# Patient Record
Sex: MALE | Race: WHITE
[De-identification: names, ages, dates, MRNs, and addresses within clinical notes are randomized per-mention and may not be internally consistent; named-entity substitution may affect disease eponyms.]

---

## 2017-09-01 ENCOUNTER — HOSPITAL ENCOUNTER (EMERGENCY)
Dept: HOSPITAL 47 - EC | Age: 32
Discharge: HOME | End: 2017-09-01
Payer: SELF-PAY

## 2017-09-01 VITALS
HEART RATE: 83 BPM | RESPIRATION RATE: 16 BRPM | DIASTOLIC BLOOD PRESSURE: 56 MMHG | TEMPERATURE: 98.1 F | SYSTOLIC BLOOD PRESSURE: 117 MMHG

## 2017-09-01 DIAGNOSIS — J44.0: Primary | ICD-10-CM

## 2017-09-01 DIAGNOSIS — J18.9: ICD-10-CM

## 2017-09-01 DIAGNOSIS — F17.200: ICD-10-CM

## 2017-09-01 LAB
ANION GAP SERPL CALC-SCNC: 8 MMOL/L
BASOPHILS # BLD AUTO: 0 K/UL (ref 0–0.2)
BASOPHILS NFR BLD AUTO: 1 %
BUN SERPL-SCNC: 8 MG/DL (ref 9–20)
CALCIUM SPEC-MCNC: 9.2 MG/DL (ref 8.4–10.2)
CH: 29.2
CHCM: 35.4
CHLORIDE SERPL-SCNC: 109 MMOL/L (ref 98–107)
CO2 SERPL-SCNC: 24 MMOL/L (ref 22–30)
EOSINOPHIL # BLD AUTO: 0.1 K/UL (ref 0–0.7)
EOSINOPHIL NFR BLD AUTO: 2 %
ERYTHROCYTE [DISTWIDTH] IN BLOOD BY AUTOMATED COUNT: 5.4 M/UL (ref 4.3–5.9)
ERYTHROCYTE [DISTWIDTH] IN BLOOD: 13.3 % (ref 11.5–15.5)
GLUCOSE SERPL-MCNC: 106 MG/DL (ref 74–99)
HCT VFR BLD AUTO: 44.8 % (ref 39–53)
HDW: 3.06
HGB BLD-MCNC: 15.7 GM/DL (ref 13–17.5)
LUC NFR BLD AUTO: 4 %
LYMPHOCYTES # SPEC AUTO: 1.3 K/UL (ref 1–4.8)
LYMPHOCYTES NFR SPEC AUTO: 26 %
MCH RBC QN AUTO: 29.1 PG (ref 25–35)
MCHC RBC AUTO-ENTMCNC: 35.1 G/DL (ref 31–37)
MCV RBC AUTO: 83 FL (ref 80–100)
MONOCYTES # BLD AUTO: 0.3 K/UL (ref 0–1)
MONOCYTES NFR BLD AUTO: 6 %
NEUTROPHILS # BLD AUTO: 3.1 K/UL (ref 1.3–7.7)
NEUTROPHILS NFR BLD AUTO: 62 %
NON-AFRICAN AMERICAN GFR(MDRD): >60
POTASSIUM SERPL-SCNC: 4.3 MMOL/L (ref 3.5–5.1)
SODIUM SERPL-SCNC: 141 MMOL/L (ref 137–145)
WBC # BLD AUTO: 0.17 10*3/UL
WBC # BLD AUTO: 4.9 K/UL (ref 3.8–10.6)
WBC (PEROX): 5.13

## 2017-09-01 PROCEDURE — 99284 EMERGENCY DEPT VISIT MOD MDM: CPT

## 2017-09-01 PROCEDURE — 71020: CPT

## 2017-09-01 PROCEDURE — 96375 TX/PRO/DX INJ NEW DRUG ADDON: CPT

## 2017-09-01 PROCEDURE — 36415 COLL VENOUS BLD VENIPUNCTURE: CPT

## 2017-09-01 PROCEDURE — 96366 THER/PROPH/DIAG IV INF ADDON: CPT

## 2017-09-01 PROCEDURE — 80048 BASIC METABOLIC PNL TOTAL CA: CPT

## 2017-09-01 PROCEDURE — 96365 THER/PROPH/DIAG IV INF INIT: CPT

## 2017-09-01 PROCEDURE — 87502 INFLUENZA DNA AMP PROBE: CPT

## 2017-09-01 PROCEDURE — 94640 AIRWAY INHALATION TREATMENT: CPT

## 2017-09-01 PROCEDURE — 85025 COMPLETE CBC W/AUTO DIFF WBC: CPT

## 2017-09-01 PROCEDURE — 85379 FIBRIN DEGRADATION QUANT: CPT

## 2017-09-01 NOTE — XR
EXAMINATION TYPE: XR chest 2V

 

DATE OF EXAM: 9/1/2017

 

COMPARISON: NONE

 

HISTORY: Cough and congestion with shortness of breath rule out pneumonia

 

TECHNIQUE:  Frontal and lateral views of the chest are obtained.

 

FINDINGS:  Seen on 2 views there appears to be increased opacity right middle lobe worrisome for deve
loping infiltrate. Left lung is clear. No pleural effusion or pneumothorax is seen bilaterally. The c
ardiac silhouette size is within normal limits.   The osseous structures are intact.

 

IMPRESSION:  Suspicious opacity worrisome for developing right middle lobe infiltrate.

## 2017-09-01 NOTE — ED
URI HPI





- General


Chief Complaint: Upper Respiratory Infection


Stated Complaint: ILSA, BACK PAIN, WHEEZING, FEVER


Time Seen by Provider: 09/01/17 11:59


Source: patient


Mode of arrival: ambulatory


Limitations: no limitations





- History of Present Illness


Initial Comments: 





32 years old male mentioned to the last 6 days, been coughing he did spit up 

some blood as well and he is not able to bring up a bunch of phlegm he feels it 

stuck to Bring it up.  He been previously had a fever off-and-on for the last 

few days he does have a history of tobacco dependence were also 18 years he 

felt quite sec wasn't able to go to work yesterday and today.  Also complains 

about chest pain only with deep breaths no trauma to the chest, degree of 

system is unremarkable otherwise





- Related Data


 Previous Rx's











 Medication  Instructions  Recorded


 


Levofloxacin [Levaquin] 750 mg PO DAILY #7 tab 09/01/17











 Allergies











Allergy/AdvReac Type Severity Reaction Status Date / Time


 


No Known Allergies Allergy   Verified 09/01/17 11:43














Review of Systems


ROS Statement: 


Those systems with pertinent positive or pertinent negative responses have been 

documented in the HPI.





ROS Other: All systems not noted in ROS Statement are negative.





Past Medical History


Additional Past Medical History / Comment(s): Hepatitic C


History of Any Multi-Drug Resistant Organisms: None Reported


Past Surgical History: No Surgical Hx Reported


Past Psychological History: No Psychological Hx Reported


Smoking Status: Current every day smoker


Past Alcohol Use History: None Reported


Past Drug Use History: Marijuana





General Exam





- General Exam Comments


Initial Comments: 





General:  The patient is awake and alert, in no distress, and does not appear 

acutely ill.  There is no coughing quite bad


Skin:  Skin is warm and dry and no rashes or lesions are noted. 


Eye:  Pupils are equal, round and reactive to light, extra-ocular movements are 

intact; there is normal conjunctiva bilaterally.  


Ears, nose, mouth and throat:  There are moist mucous membranes and no oral 

lesions. 


Neck:  The neck is supple, there is no tenderness  or JVD.  


Cardiovascular:  There is a regular rate and rhythm. No murmur, rub or gallop 

is appreciated.


Respiratory: To auscultation bilateral, noticed some crackles on the right side 

and he is wheezing bilateral


Gastrointestinal:  Soft, non-distended, non-tender abdomen without masses or 

organomegaly noted. There is no rebound or guarding present. Bowel sounds are 

unremarkable. 


Back:  There is no tenderness to palpation in the midline. There is no obvious 

deformity.


Musculoskeletal:  Normal ROM, no tenderness, There is no pedal edema. There is 

no calf tenderness or swelling. No cords were appreciated.  


Neurological:  CN II-XII intact, Cranial nerves III through XII are intact. 

There are no obvious motor or sensory deficits. Coordination appears grossly 

intact. Speech is normal.


Psychiatric:  Cooperative, appropriate mood & affect, normal judgment.  





Limitations: no limitations





Course





 Vital Signs











  09/01/17 09/01/17 09/01/17





  11:32 12:19 12:25


 


Temperature 97.4 F L  


 


Pulse Rate 85 84 80


 


Respiratory 18  





Rate   


 


Blood Pressure 131/69  


 


O2 Sat by Pulse 95  





Oximetry   








Labs are reviewed along with the imaging, CBC is normal, d-dimer is negative 

chest x-ray points out towards developing infiltrate and exam is consistent 

with a COPD





Medical Decision Making





- Lab Data


Result diagrams: 


 09/01/17 12:30





 09/01/17 12:30





 Lab Results











  09/01/17 09/01/17 09/01/17 Range/Units





  12:30 12:30 12:30 


 


WBC   4.9   (3.8-10.6)  k/uL


 


RBC   5.40   (4.30-5.90)  m/uL


 


Hgb   15.7   (13.0-17.5)  gm/dL


 


Hct   44.8   (39.0-53.0)  %


 


MCV   83.0   (80.0-100.0)  fL


 


MCH   29.1   (25.0-35.0)  pg


 


MCHC   35.1   (31.0-37.0)  g/dL


 


RDW   13.3   (11.5-15.5)  %


 


Plt Count   236   (150-450)  k/uL


 


Neutrophils %   62   %


 


Lymphocytes %   26   %


 


Monocytes %   6   %


 


Eosinophils %   2   %


 


Basophils %   1   %


 


Neutrophils #   3.1   (1.3-7.7)  k/uL


 


Lymphocytes #   1.3   (1.0-4.8)  k/uL


 


Monocytes #   0.3   (0-1.0)  k/uL


 


Eosinophils #   0.1   (0-0.7)  k/uL


 


Basophils #   0.0   (0-0.2)  k/uL


 


D-Dimer    0.57  (<0.60)  mg/L FEU


 


Sodium  141    (137-145)  mmol/L


 


Potassium  4.3    (3.5-5.1)  mmol/L


 


Chloride  109 H    ()  mmol/L


 


Carbon Dioxide  24    (22-30)  mmol/L


 


Anion Gap  8    mmol/L


 


BUN  8 L    (9-20)  mg/dL


 


Creatinine  0.70    (0.66-1.25)  mg/dL


 


Est GFR (MDRD) Af Amer  >60    (>60 ml/min/1.73 sqM)  


 


Est GFR (MDRD) Non-Af  >60    (>60 ml/min/1.73 sqM)  


 


Glucose  106 H    (74-99)  mg/dL


 


Calcium  9.2    (8.4-10.2)  mg/dL


 


Influenza Type A RNA     (Not Detectd)  


 


Influenza Type B (PCR)     (Not Detectd)  














  09/01/17 Range/Units





  12:30 


 


WBC   (3.8-10.6)  k/uL


 


RBC   (4.30-5.90)  m/uL


 


Hgb   (13.0-17.5)  gm/dL


 


Hct   (39.0-53.0)  %


 


MCV   (80.0-100.0)  fL


 


MCH   (25.0-35.0)  pg


 


MCHC   (31.0-37.0)  g/dL


 


RDW   (11.5-15.5)  %


 


Plt Count   (150-450)  k/uL


 


Neutrophils %   %


 


Lymphocytes %   %


 


Monocytes %   %


 


Eosinophils %   %


 


Basophils %   %


 


Neutrophils #   (1.3-7.7)  k/uL


 


Lymphocytes #   (1.0-4.8)  k/uL


 


Monocytes #   (0-1.0)  k/uL


 


Eosinophils #   (0-0.7)  k/uL


 


Basophils #   (0-0.2)  k/uL


 


D-Dimer   (<0.60)  mg/L FEU


 


Sodium   (137-145)  mmol/L


 


Potassium   (3.5-5.1)  mmol/L


 


Chloride   ()  mmol/L


 


Carbon Dioxide   (22-30)  mmol/L


 


Anion Gap   mmol/L


 


BUN   (9-20)  mg/dL


 


Creatinine   (0.66-1.25)  mg/dL


 


Est GFR (MDRD) Af Amer   (>60 ml/min/1.73 sqM)  


 


Est GFR (MDRD) Non-Af   (>60 ml/min/1.73 sqM)  


 


Glucose   (74-99)  mg/dL


 


Calcium   (8.4-10.2)  mg/dL


 


Influenza Type A RNA  Not Detected  (Not Detectd)  


 


Influenza Type B (PCR)  Not Detected  (Not Detectd)  














Disposition


Clinical Impression: 


 Pneumonia, COPD (chronic obstructive pulmonary disease)





Disposition: HOME SELF-CARE


Condition: Good


Instructions:  Pneumonia (ED)


Prescriptions: 


Levofloxacin [Levaquin] 750 mg PO DAILY #7 tab


Referrals: 


None,Stated [Primary Care Provider] - 1-2 days

## 2017-10-11 ENCOUNTER — HOSPITAL ENCOUNTER (EMERGENCY)
Dept: HOSPITAL 47 - EC | Age: 32
Discharge: HOME | End: 2017-10-11
Payer: COMMERCIAL

## 2017-10-11 VITALS — HEART RATE: 68 BPM | SYSTOLIC BLOOD PRESSURE: 124 MMHG | DIASTOLIC BLOOD PRESSURE: 58 MMHG

## 2017-10-11 VITALS — RESPIRATION RATE: 18 BRPM | TEMPERATURE: 97.8 F

## 2017-10-11 DIAGNOSIS — R11.0: ICD-10-CM

## 2017-10-11 DIAGNOSIS — R10.9: Primary | ICD-10-CM

## 2017-10-11 DIAGNOSIS — F17.200: ICD-10-CM

## 2017-10-11 LAB
ALP SERPL-CCNC: 125 U/L (ref 38–126)
ALT SERPL-CCNC: 89 U/L (ref 21–72)
AMYLASE SERPL-CCNC: 73 U/L (ref 30–110)
ANION GAP SERPL CALC-SCNC: 9 MMOL/L
AST SERPL-CCNC: 54 U/L (ref 17–59)
BASOPHILS # BLD AUTO: 0 K/UL (ref 0–0.2)
BASOPHILS NFR BLD AUTO: 1 %
BUN SERPL-SCNC: 9 MG/DL (ref 9–20)
CALCIUM SPEC-MCNC: 8.9 MG/DL (ref 8.4–10.2)
CH: 29.6
CHCM: 34.1
CHLORIDE SERPL-SCNC: 107 MMOL/L (ref 98–107)
CO2 SERPL-SCNC: 23 MMOL/L (ref 22–30)
EOSINOPHIL # BLD AUTO: 0.2 K/UL (ref 0–0.7)
EOSINOPHIL NFR BLD AUTO: 2 %
ERYTHROCYTE [DISTWIDTH] IN BLOOD BY AUTOMATED COUNT: 5.12 M/UL (ref 4.3–5.9)
ERYTHROCYTE [DISTWIDTH] IN BLOOD: 13.5 % (ref 11.5–15.5)
GLUCOSE SERPL-MCNC: 91 MG/DL (ref 74–99)
HCT VFR BLD AUTO: 44.7 % (ref 39–53)
HDW: 2.78
HGB BLD-MCNC: 14.9 GM/DL (ref 13–17.5)
LUC NFR BLD AUTO: 4 %
LYMPHOCYTES # SPEC AUTO: 1.2 K/UL (ref 1–4.8)
LYMPHOCYTES NFR SPEC AUTO: 16 %
MCH RBC QN AUTO: 29 PG (ref 25–35)
MCHC RBC AUTO-ENTMCNC: 33.3 G/DL (ref 31–37)
MCV RBC AUTO: 87.3 FL (ref 80–100)
MONOCYTES # BLD AUTO: 0.7 K/UL (ref 0–1)
MONOCYTES NFR BLD AUTO: 10 %
NEUTROPHILS # BLD AUTO: 5.2 K/UL (ref 1.3–7.7)
NEUTROPHILS NFR BLD AUTO: 68 %
NON-AFRICAN AMERICAN GFR(MDRD): >60
PH UR: 6 [PH] (ref 5–8)
POTASSIUM SERPL-SCNC: 4.4 MMOL/L (ref 3.5–5.1)
PROT SERPL-MCNC: 6.9 G/DL (ref 6.3–8.2)
SODIUM SERPL-SCNC: 139 MMOL/L (ref 137–145)
SP GR UR: 1.01 (ref 1–1.03)
UA BILLING (MACRO VS. MICRO): (no result)
UROBILINOGEN UR QL STRIP: <2 MG/DL (ref ?–2)
WBC # BLD AUTO: 0.28 10*3/UL
WBC # BLD AUTO: 7.7 K/UL (ref 3.8–10.6)
WBC (PEROX): 7.58

## 2017-10-11 PROCEDURE — 85025 COMPLETE CBC W/AUTO DIFF WBC: CPT

## 2017-10-11 PROCEDURE — 80053 COMPREHEN METABOLIC PANEL: CPT

## 2017-10-11 PROCEDURE — 83690 ASSAY OF LIPASE: CPT

## 2017-10-11 PROCEDURE — 74000: CPT

## 2017-10-11 PROCEDURE — 82150 ASSAY OF AMYLASE: CPT

## 2017-10-11 PROCEDURE — 81003 URINALYSIS AUTO W/O SCOPE: CPT

## 2017-10-11 PROCEDURE — 36415 COLL VENOUS BLD VENIPUNCTURE: CPT

## 2017-10-11 PROCEDURE — 99284 EMERGENCY DEPT VISIT MOD MDM: CPT

## 2017-10-11 PROCEDURE — 96361 HYDRATE IV INFUSION ADD-ON: CPT

## 2017-10-11 PROCEDURE — 96375 TX/PRO/DX INJ NEW DRUG ADDON: CPT

## 2017-10-11 PROCEDURE — 96374 THER/PROPH/DIAG INJ IV PUSH: CPT

## 2017-10-11 NOTE — XR
EXAMINATION TYPE: XR KUB

 

DATE OF EXAM: 10/11/2017

 

COMPARISON: NONE

 

HISTORY: Left flank pain

 

TECHNIQUE: 2 views

 

FINDINGS: Bowel gas pattern is normal. There is no sign of intestinal obstruction or pneumoperitoneum
. Fecal pattern is normal. There are no pathologic calcifications. Lung bases are clear. There is no 
sign of a mass. Bony structures appear intact.

 

IMPRESSION: Nonacute abdomen.

## 2017-10-11 NOTE — ED
Abdominal Pain HPI





- General


Chief Complaint: Abdominal Pain


Stated Complaint: side pain


Time Seen by Provider: 10/11/17 08:49


Source: patient, RN notes reviewed


Mode of arrival: ambulatory


Limitations: no limitations





- History of Present Illness


Initial Comments: 





This a 32-year-old male presents emergency Department chief complaint left side 

pain.  Patient states pain started yesterday was sudden onset of pain.  Patient 

states pain radiates on his left low back to his left lower abdomen.  Patient 

states that nothing makes the pain feel better or worse.  Patient's had some 

nausea no vomiting no diarrhea no constipation.  He states his urine looked 

very dark ashtray when it started.  He states that he developed bloody though.  

Denies any dysuria, chest pain or shortness breath.





- Related Data


 Previous Rx's











 Medication  Instructions  Recorded


 


Acetaminophen-Codeine 300-30mg 1 tab PO Q4H PRN #20 tablet 10/11/17





[Tylenol #3]  


 


Ibuprofen [Motrin] 600 mg PO Q8HR PRN #30 tab 10/11/17











 Allergies











Allergy/AdvReac Type Severity Reaction Status Date / Time


 


No Known Allergies Allergy   Verified 10/11/17 09:13














Review of Systems


ROS Statement: 


Those systems with pertinent positive or pertinent negative responses have been 

documented in the HPI.





ROS Other: All systems not noted in ROS Statement are negative.





Past Medical History


Additional Past Medical History / Comment(s): Hepatitic C


History of Any Multi-Drug Resistant Organisms: None Reported


Past Surgical History: No Surgical Hx Reported


Past Psychological History: No Psychological Hx Reported


Smoking Status: Current every day smoker


Past Alcohol Use History: None Reported


Past Drug Use History: Marijuana





General Exam


Limitations: no limitations


General appearance: alert, in no apparent distress


Head exam: Present: atraumatic, normocephalic, normal inspection


Respiratory exam: Present: normal lung sounds bilaterally.  Absent: respiratory 

distress, wheezes, rales, rhonchi, stridor


Cardiovascular Exam: Present: regular rate, normal rhythm, normal heart sounds.

  Absent: systolic murmur, diastolic murmur, rubs, gallop, clicks


GI/Abdominal exam: Present: soft, normal bowel sounds.  Absent: distended, 

tenderness, guarding, rebound, rigid


Back exam: Absent: CVA tenderness (R), CVA tenderness (L)


Skin exam: Present: warm, dry, intact, normal color.  Absent: rash





Course


 Vital Signs











  10/11/17 10/11/17





  08:45 10:42


 


Temperature 97.8 F 


 


Pulse Rate 93 68


 


Respiratory 18 18





Rate  


 


Blood Pressure 129/77 124/58


 


O2 Sat by Pulse 99 96





Oximetry  














Medical Decision Making





- Medical Decision Making





32-year-old male presented unresponsive for left flank pain.  Patient's lab work

, x-ray was reviewed there is no acute abnormality.  Patient does not have any 

anterior more consistent with stone.  Patient pain may be muscle skeletal in 

nature at this time.  Patient will be discharged advised follow-up with PCP 

return parameters were discussed.





- Lab Data


Result diagrams: 


 10/11/17 09:15





 10/11/17 09:15


 Lab Results











  10/11/17 10/11/17 10/11/17 Range/Units





  09:15 09:15 09:25 


 


WBC   7.7   (3.8-10.6)  k/uL


 


RBC   5.12   (4.30-5.90)  m/uL


 


Hgb   14.9   (13.0-17.5)  gm/dL


 


Hct   44.7   (39.0-53.0)  %


 


MCV   87.3   (80.0-100.0)  fL


 


MCH   29.0   (25.0-35.0)  pg


 


MCHC   33.3   (31.0-37.0)  g/dL


 


RDW   13.5   (11.5-15.5)  %


 


Plt Count   242   (150-450)  k/uL


 


Neutrophils %   68   %


 


Lymphocytes %   16   %


 


Monocytes %   10   %


 


Eosinophils %   2   %


 


Basophils %   1   %


 


Neutrophils #   5.2   (1.3-7.7)  k/uL


 


Lymphocytes #   1.2   (1.0-4.8)  k/uL


 


Monocytes #   0.7   (0-1.0)  k/uL


 


Eosinophils #   0.2   (0-0.7)  k/uL


 


Basophils #   0.0   (0-0.2)  k/uL


 


Sodium  139    (137-145)  mmol/L


 


Potassium  4.4    (3.5-5.1)  mmol/L


 


Chloride  107    ()  mmol/L


 


Carbon Dioxide  23    (22-30)  mmol/L


 


Anion Gap  9    mmol/L


 


BUN  9    (9-20)  mg/dL


 


Creatinine  0.81    (0.66-1.25)  mg/dL


 


Est GFR (MDRD) Af Amer  >60    (>60 ml/min/1.73 sqM)  


 


Est GFR (MDRD) Non-Af  >60    (>60 ml/min/1.73 sqM)  


 


Glucose  91    (74-99)  mg/dL


 


Calcium  8.9    (8.4-10.2)  mg/dL


 


Total Bilirubin  0.6    (0.2-1.3)  mg/dL


 


AST  54    (17-59)  U/L


 


ALT  89 H    (21-72)  U/L


 


Alkaline Phosphatase  125    ()  U/L


 


Total Protein  6.9    (6.3-8.2)  g/dL


 


Albumin  3.6    (3.5-5.0)  g/dL


 


Amylase  73    ()  U/L


 


Lipase  89    ()  U/L


 


Urine Color    Yellow  


 


Urine Appearance    Clear  (Clear)  


 


Urine pH    6.0  (5.0-8.0)  


 


Ur Specific Gravity    1.010  (1.001-1.035)  


 


Urine Protein    Negative  (Negative)  


 


Urine Glucose (UA)    Negative  (Negative)  


 


Urine Ketones    Negative  (Negative)  


 


Urine Blood    Negative  (Negative)  


 


Urine Nitrite    Negative  (Negative)  


 


Urine Bilirubin    Negative  (Negative)  


 


Urine Urobilinogen    <2.0  (<2.0)  mg/dL


 


Ur Leukocyte Esterase    Negative  (Negative)  














Disposition


Clinical Impression: 


 Flank pain





Disposition: HOME SELF-CARE


Condition: Stable


Instructions:  Flank Pain (ED)


Additional Instructions: 


Please return to the Emergency Department if symptoms worsen or any other 

concerns.


Prescriptions: 


Acetaminophen-Codeine 300-30mg [Tylenol #3] 1 tab PO Q4H PRN #20 tablet


 PRN Reason: pain


Ibuprofen [Motrin] 600 mg PO Q8HR PRN #30 tab


 PRN Reason: Pain


Referrals: 


None,Stated [Primary Care Provider] - 1-2 days


Reyna Maldonado MD [STAFF PHYSICIAN] - 1-2 days


Time of Disposition: 10:47

## 2017-10-24 ENCOUNTER — HOSPITAL ENCOUNTER (EMERGENCY)
Dept: HOSPITAL 47 - EC | Age: 32
Discharge: HOME | End: 2017-10-24
Payer: COMMERCIAL

## 2017-10-24 VITALS — SYSTOLIC BLOOD PRESSURE: 111 MMHG | HEART RATE: 79 BPM | DIASTOLIC BLOOD PRESSURE: 56 MMHG

## 2017-10-24 VITALS — TEMPERATURE: 97.4 F | RESPIRATION RATE: 16 BRPM

## 2017-10-24 DIAGNOSIS — R51: Primary | ICD-10-CM

## 2017-10-24 DIAGNOSIS — H53.149: ICD-10-CM

## 2017-10-24 DIAGNOSIS — F17.200: ICD-10-CM

## 2017-10-24 DIAGNOSIS — R42: ICD-10-CM

## 2017-10-24 DIAGNOSIS — R11.0: ICD-10-CM

## 2017-10-24 PROCEDURE — 99284 EMERGENCY DEPT VISIT MOD MDM: CPT

## 2017-10-24 PROCEDURE — 96372 THER/PROPH/DIAG INJ SC/IM: CPT

## 2017-10-24 PROCEDURE — 70450 CT HEAD/BRAIN W/O DYE: CPT

## 2017-10-24 NOTE — CT
EXAMINATION TYPE: CT brain wo con

 

DATE OF EXAM: 10/24/2017

 

COMPARISON: NONE

 

HISTORY: Headache, Dizziness

 

CT DLP: 1054.2 mGycm

 

Unenhanced CT of the brain was performed.  

 

The ventricles, basal cisterns and sulci overlying the cerebral convexities demonstrate a normal appe
arance.  

 

There is no evidence for intracranial hemorrhage or sulcal effacement.  

 

No mass effects are seen.  

 

Osseous calvarium is intact.

 

If symptoms persist consider MRI as clinically warranted.  

 

IMPRESSION:

 

1.  No acute intracranial process is seen at this time.

## 2017-10-24 NOTE — ED
General Adult HPI





- General


Chief complaint: Headache


Stated complaint: Headache, Dizzy


Time Seen by Provider: 10/24/17 07:23


Source: patient, RN notes reviewed, old records reviewed


Mode of arrival: wheelchair


Limitations: no limitations





- History of Present Illness


Initial comments: 





Patient is a pleasant 32-year-old male presenting to the emergency department 

with complaints of headache.  Onset was yesterday evening.  Gradual onset over 

a few hours.  Headache is on the top of the head.  Headache has been severe as 8

/10 however currently a 6 or 7/10.  Patient does have some photophobia.  

Patient has also had some nausea without vomiting.  Patient feels somewhat 

lightheaded.  No weakness.  No confusion.  No history of chronic headaches.





- Related Data


 Previous Rx's











 Medication  Instructions  Recorded


 


Acetaminophen-Codeine 300-30mg 1 tab PO Q4H PRN #20 tablet 10/11/17





[Tylenol #3]  


 


Ibuprofen [Motrin] 600 mg PO Q8HR PRN #30 tab 10/11/17


 


Metoclopramide HCl [Reglan] 10 mg PO Q6HR PRN #15 tablet 10/24/17











 Allergies











Allergy/AdvReac Type Severity Reaction Status Date / Time


 


No Known Allergies Allergy   Verified 10/24/17 08:08














Review of Systems


ROS Statement: 


Those systems with pertinent positive or pertinent negative responses have been 

documented in the HPI.





ROS Other: All systems not noted in ROS Statement are negative.


Constitutional: Denies: fever


Eyes: Denies: eye pain


ENT: Denies: ear pain


Respiratory: Denies: cough


Cardiovascular: Denies: chest pain


Endocrine: Denies: fatigue


Gastrointestinal: Denies: abdominal pain


Genitourinary: Denies: dysuria


Musculoskeletal: Denies: back pain


Skin: Denies: rash


Neurological: Reports: headache.  Denies: weakness





Past Medical History


Additional Past Medical History / Comment(s): Hepatitic C


History of Any Multi-Drug Resistant Organisms: None Reported


Past Surgical History: No Surgical Hx Reported


Past Psychological History: No Psychological Hx Reported


Smoking Status: Current every day smoker


Past Alcohol Use History: None Reported


Past Drug Use History: Marijuana





General Exam


Limitations: no limitations


General appearance: alert, in no apparent distress


Head exam: Present: atraumatic


Eye exam: Present: normal appearance, PERRL, EOMI.  Absent: nystagmus


ENT exam: Present: normal oropharynx


Neck exam: Present: normal inspection.  Absent: meningismus


Respiratory exam: Present: normal lung sounds bilaterally


Cardiovascular Exam: Present: regular rate, normal rhythm


GI/Abdominal exam: Present: soft.  Absent: tenderness


Extremities exam: Present: normal inspection


Neurological exam: Present: alert, oriented X3, CN II-XII intact.  Absent: 

motor sensory deficit


  ** Expanded


Speech: Present: fluid speech


Cranial nerves: EOM's Intact: Normal, Facial Sensation: Normal


Sensory exam: Upper Extremity Light Touch: Normal, Lower Extremity Light Touch: 

Normal


Motor strength exam: RUE: 5, LUE: 5, RLE: 5, LLE: 5


Eye Response: (4) open spontaneously


Motor Response: (6) obeys commands


Verbal Response: (5) oriented


Psychiatric exam: Present: normal affect, normal mood


Skin exam: Present: normal color





Course


 Vital Signs











  10/24/17





  07:01


 


Temperature 97.4 F L


 


Pulse Rate 90


 


Respiratory 16





Rate 


 


Blood Pressure 113/70


 


O2 Sat by Pulse 96





Oximetry 














Medical Decision Making





- Medical Decision Making





Patient reevaluated and improved.  Patient states discomfort is tolerable.  

Patient requests work note for today and return tomorrow without restrictions.





- Radiology Data


Radiology results: image reviewed (Computed tomography scan of the brain shows 

no acute process)





Disposition


Clinical Impression: 


 Cephalgia





Disposition: HOME SELF-CARE


Condition: Stable


Instructions:  Acute Headache (ED)


Additional Instructions: 


Please follow-up with your DrCristela in the next day or 2 for recheck.  Return for 

weakness, confusion, fever, worsening or changing symptoms or other concerns.


Prescriptions: 


Metoclopramide HCl [Reglan] 10 mg PO Q6HR PRN #15 tablet


 PRN Reason: Nausea


Referrals: 


Ella Cardona MD [Primary Care Provider] - 1-2 days


Time of Disposition: 08:36

## 2018-09-17 ENCOUNTER — HOSPITAL ENCOUNTER (INPATIENT)
Dept: HOSPITAL 47 - EC | Age: 33
LOS: 7 days | Discharge: HOME | DRG: 885 | End: 2018-09-24
Attending: PSYCHIATRY & NEUROLOGY | Admitting: PSYCHIATRY & NEUROLOGY
Payer: COMMERCIAL

## 2018-09-17 VITALS — BODY MASS INDEX: 30.6 KG/M2

## 2018-09-17 DIAGNOSIS — F12.10: ICD-10-CM

## 2018-09-17 DIAGNOSIS — B19.20: ICD-10-CM

## 2018-09-17 DIAGNOSIS — Z65.3: ICD-10-CM

## 2018-09-17 DIAGNOSIS — F11.11: ICD-10-CM

## 2018-09-17 DIAGNOSIS — D17.0: ICD-10-CM

## 2018-09-17 DIAGNOSIS — F41.9: ICD-10-CM

## 2018-09-17 DIAGNOSIS — R45.851: ICD-10-CM

## 2018-09-17 DIAGNOSIS — R45.850: ICD-10-CM

## 2018-09-17 DIAGNOSIS — M54.5: ICD-10-CM
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
## 2018-09-17 DIAGNOSIS — F31.9: Primary | ICD-10-CM

## 2018-09-17 DIAGNOSIS — F17.200: ICD-10-CM

## 2018-09-17 DIAGNOSIS — G89.29: ICD-10-CM

## 2018-09-17 PROCEDURE — 80306 DRUG TEST PRSMV INSTRMNT: CPT

## 2018-09-17 PROCEDURE — 86694 HERPES SIMPLEX NES ANTBDY: CPT

## 2018-09-17 PROCEDURE — 86696 HERPES SIMPLEX TYPE 2 TEST: CPT

## 2018-09-17 PROCEDURE — 86663 EPSTEIN-BARR ANTIBODY: CPT

## 2018-09-17 PROCEDURE — 80053 COMPREHEN METABOLIC PANEL: CPT

## 2018-09-17 PROCEDURE — 86665 EPSTEIN-BARR CAPSID VCA: CPT

## 2018-09-17 PROCEDURE — 85027 COMPLETE CBC AUTOMATED: CPT

## 2018-09-17 PROCEDURE — 99285 EMERGENCY DEPT VISIT HI MDM: CPT

## 2018-09-17 PROCEDURE — 87517 HEPATITIS B DNA QUANT: CPT

## 2018-09-17 PROCEDURE — 85025 COMPLETE CBC W/AUTO DIFF WBC: CPT

## 2018-09-17 PROCEDURE — 87522 HEPATITIS C REVRS TRNSCRPJ: CPT

## 2018-09-17 PROCEDURE — 86707 HEPATITIS BE ANTIBODY: CPT

## 2018-09-17 PROCEDURE — 82075 ASSAY OF BREATH ETHANOL: CPT

## 2018-09-17 PROCEDURE — 76705 ECHO EXAM OF ABDOMEN: CPT

## 2018-09-17 PROCEDURE — 87340 HEPATITIS B SURFACE AG IA: CPT

## 2018-09-17 PROCEDURE — 86695 HERPES SIMPLEX TYPE 1 TEST: CPT

## 2018-09-17 PROCEDURE — 87390 HIV-1 AG IA: CPT

## 2018-09-17 PROCEDURE — 86645 CMV ANTIBODY IGM: CPT

## 2018-09-17 PROCEDURE — 86664 EPSTEIN-BARR NUCLEAR ANTIGEN: CPT

## 2018-09-17 PROCEDURE — 76536 US EXAM OF HEAD AND NECK: CPT

## 2018-09-17 PROCEDURE — 84443 ASSAY THYROID STIM HORMONE: CPT

## 2018-09-17 PROCEDURE — 81003 URINALYSIS AUTO W/O SCOPE: CPT

## 2018-09-17 PROCEDURE — 86709 HEPATITIS A IGM ANTIBODY: CPT

## 2018-09-17 PROCEDURE — 74177 CT ABD & PELVIS W/CONTRAST: CPT

## 2018-09-17 PROCEDURE — 83520 IMMUNOASSAY QUANT NOS NONAB: CPT

## 2018-09-17 PROCEDURE — 87350 HEPATITIS BE AG IA: CPT

## 2018-09-17 PROCEDURE — 86704 HEP B CORE ANTIBODY TOTAL: CPT

## 2018-09-17 PROCEDURE — 86705 HEP B CORE ANTIBODY IGM: CPT

## 2018-09-17 PROCEDURE — 86706 HEP B SURFACE ANTIBODY: CPT

## 2018-09-17 PROCEDURE — 86803 HEPATITIS C AB TEST: CPT

## 2018-09-17 PROCEDURE — 86644 CMV ANTIBODY: CPT

## 2018-09-17 PROCEDURE — 87902 NFCT AGT GNTYP ALYS HEP C: CPT

## 2018-09-17 PROCEDURE — 85610 PROTHROMBIN TIME: CPT
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
## 2018-09-18 LAB
ALBUMIN SERPL-MCNC: 4.2 G/DL (ref 3.5–5)
ALP SERPL-CCNC: 165 U/L (ref 38–126)
ALT SERPL-CCNC: 984 U/L (ref 21–72)
ANION GAP SERPL CALC-SCNC: 9 MMOL/L
AST SERPL-CCNC: 619 U/L (ref 17–59)
BASOPHILS # BLD AUTO: 0 K/UL (ref 0–0.2)
BASOPHILS NFR BLD AUTO: 1 %
BUN SERPL-SCNC: 11 MG/DL (ref 9–20)
CALCIUM SPEC-MCNC: 9.4 MG/DL (ref 8.4–10.2)
CHLORIDE SERPL-SCNC: 105 MMOL/L (ref 98–107)
CO2 SERPL-SCNC: 26 MMOL/L (ref 22–30)
EOSINOPHIL # BLD AUTO: 0.2 K/UL (ref 0–0.7)
EOSINOPHIL NFR BLD AUTO: 4 %
ERYTHROCYTE [DISTWIDTH] IN BLOOD BY AUTOMATED COUNT: 5.36 M/UL (ref 4.3–5.9)
ERYTHROCYTE [DISTWIDTH] IN BLOOD: 13.8 % (ref 11.5–15.5)
GLUCOSE SERPL-MCNC: 91 MG/DL (ref 74–99)
HCT VFR BLD AUTO: 46.6 % (ref 39–53)
HGB BLD-MCNC: 15.4 GM/DL (ref 13–17.5)
LYMPHOCYTES # SPEC AUTO: 2 K/UL (ref 1–4.8)
LYMPHOCYTES NFR SPEC AUTO: 33 %
MCH RBC QN AUTO: 28.6 PG (ref 25–35)
MCHC RBC AUTO-ENTMCNC: 33 G/DL (ref 31–37)
MCV RBC AUTO: 86.8 FL (ref 80–100)
MONOCYTES # BLD AUTO: 0.6 K/UL (ref 0–1)
MONOCYTES NFR BLD AUTO: 10 %
NEUTROPHILS # BLD AUTO: 3.1 K/UL (ref 1.3–7.7)
NEUTROPHILS NFR BLD AUTO: 51 %
PH UR: 7 [PH] (ref 5–8)
PLATELET # BLD AUTO: 241 K/UL (ref 150–450)
POTASSIUM SERPL-SCNC: 4.6 MMOL/L (ref 3.5–5.1)
PROT SERPL-MCNC: 7.7 G/DL (ref 6.3–8.2)
SODIUM SERPL-SCNC: 140 MMOL/L (ref 137–145)
SP GR UR: 1.02 (ref 1–1.03)
UROBILINOGEN UR QL STRIP: 2 MG/DL (ref ?–2)
WBC # BLD AUTO: 6 K/UL (ref 3.8–10.6)

## 2018-09-18 RX ADMIN — NICOTINE SCH: 14 PATCH, EXTENDED RELEASE TRANSDERMAL at 10:22

## 2018-09-18 RX ADMIN — IOPAMIDOL PRN ML: 612 INJECTION, SOLUTION INTRAVENOUS at 18:18

## 2018-09-18 RX ADMIN — IOPAMIDOL PRN ML: 612 INJECTION, SOLUTION INTRAVENOUS at 19:28

## 2018-09-18 RX ADMIN — NICOTINE SCH PATCH: 14 PATCH, EXTENDED RELEASE TRANSDERMAL at 13:41

## 2018-09-18 NOTE — CT
EXAMINATION TYPE: CT abdomen pelvis w con

 

DATE OF EXAM: 9/18/2018

 

COMPARISON: None

 

HISTORY: Right lower abdominal mass.

 

CT DLP: 730.9 mGycm

Automated exposure control for dose reduction was used.

 

TECHNIQUE:  Helical acquisition of images was performed from the lung bases through the pelvis.

 

CONTRAST: 

Performed with Oral Contrast and with IV Contrast, patient injected with 100ml mL of Isovue 300.

 

FINDINGS: 

 

Lung bases are clear. There is no pleural effusion. Heart size is normal. There is no pericardial eff
usion.

 

Liver spleen pancreas gallbladder appear normal. Bile ducts are not dilated. There is no adrenal mass
. The kidneys show satisfactory contrast opacification. There is no hydronephrosis. There is no retro
peritoneal adenopathy. There is no mesenteric adenopathy. I see no intestinal wall thickening. There 
are no dilated loops. Bladder distends smoothly. Appendix appears normal.

 

Lumbar spine is intact. There is no inguinal hernia. There is no umbilical hernia. Stomach appears no
rmal. Soft tissues are unremarkable.

IMPRESSION: 

NEGATIVE CT SCAN OF THE ABDOMEN AND PELVIS. NO EVIDENCE OF RIGHT LOWER ABDOMINAL MASS.

## 2018-09-18 NOTE — US
EXAMINATION TYPE: US thyroid st tissue head/neck

 

DATE OF EXAM: 9/18/2018

 

COMPARISON: NONE

 

CLINICAL HISTORY: Posterior neck mass. EXAMINATION TYPE: US thyroid st tissue head/neck

 

DATE OF EXAM: 9/18/2018

 

COMPARISON: NONE

 

CLINICAL HISTORY: Posterior neck mass. Posterior right neck. lump

 

No definite mass identified with exam. 

 

 

 

 

 

IMPRESSION:  The right side posterior neck region was scanned in the area of concern. No discrete sol
id or cystic mass was identified.
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
## 2018-09-19 VITALS — RESPIRATION RATE: 16 BRPM

## 2018-09-19 LAB
ALBUMIN SERPL-MCNC: 4.1 G/DL (ref 3.5–5)
ALP SERPL-CCNC: 153 U/L (ref 38–126)
ALT SERPL-CCNC: 1118 U/L (ref 21–72)
ANION GAP SERPL CALC-SCNC: 6 MMOL/L
AST SERPL-CCNC: 683 U/L (ref 17–59)
BUN SERPL-SCNC: 13 MG/DL (ref 9–20)
CALCIUM SPEC-MCNC: 9.5 MG/DL (ref 8.4–10.2)
CHLORIDE SERPL-SCNC: 105 MMOL/L (ref 98–107)
CO2 SERPL-SCNC: 29 MMOL/L (ref 22–30)
ERYTHROCYTE [DISTWIDTH] IN BLOOD BY AUTOMATED COUNT: 5.4 M/UL (ref 4.3–5.9)
ERYTHROCYTE [DISTWIDTH] IN BLOOD: 13.5 % (ref 11.5–15.5)
GLUCOSE SERPL-MCNC: 106 MG/DL (ref 74–99)
HBV SURFACE AB SERPL IA-ACNC: 405.9 MIU/ML
HCT VFR BLD AUTO: 46.3 % (ref 39–53)
HGB BLD-MCNC: 15.4 GM/DL (ref 13–17.5)
MCH RBC QN AUTO: 28.5 PG (ref 25–35)
MCHC RBC AUTO-ENTMCNC: 33.3 G/DL (ref 31–37)
MCV RBC AUTO: 85.7 FL (ref 80–100)
PLATELET # BLD AUTO: 222 K/UL (ref 150–450)
POTASSIUM SERPL-SCNC: 4.5 MMOL/L (ref 3.5–5.1)
PROT SERPL-MCNC: 7.5 G/DL (ref 6.3–8.2)
SODIUM SERPL-SCNC: 140 MMOL/L (ref 137–145)
WBC # BLD AUTO: 5.4 K/UL (ref 3.8–10.6)

## 2018-09-19 RX ADMIN — NICOTINE SCH PATCH: 14 PATCH, EXTENDED RELEASE TRANSDERMAL at 10:43

## 2018-09-19 NOTE — P.PN
Subjective


Progress Note Date: 09/19/18





The patient was seen and examined at the bedside.  Patient was in good spirits 

and denied any active complaints including abdominal pain, nausea, vomiting, 

diarrhea, constipation, fever, chills, cough or chest pain, or shortness of 

breath. 





Objective





- Vital Signs


Vital signs: 


 Vital Signs











Temp  98.1 F   09/19/18 06:15


 


Pulse  65   09/19/18 06:15


 


Resp  16   09/19/18 06:15


 


BP  141/77   09/19/18 06:15


 


Pulse Ox  98   09/17/18 18:00














- Exam


General: Non-toxic, in no acute distress


HEENT: NC/AT, anicteric sclerae, moist conjunctiva, no lid-lag, PERRLA, 

oropharynx clear, no erythema, exudates


Cardiovascular: S1/S2 wnl, no murmurs, rubs, or gallops


Lungs: Clear to auscultation, normal respiratory effort, no accessory muscle 

use 


Abdominal: Soft, nontender, non-distended, no guarding, rebound, or rigidity, 

normoactive bowel sounds


Skin: Warm, dry


Extremities: No edema or contractures


Psychiatric: Alert and oriented to person, place and time, appropriate affect, 

Intact judgment   


Neuro: CN II-XII grossly intact, no focal motor deficits





- Labs


CBC & Chem 7: 


 09/19/18 09:54





 09/19/18 09:54


Labs: 


 Abnormal Lab Results - Last 24 Hours (Table)











  09/18/18 09/19/18 Range/Units





  08:31 09:54 


 


Glucose   106 H  (74-99)  mg/dL


 


AST   683 H  (17-59)  U/L


 


ALT   1118 H  (21-72)  U/L


 


Alkaline Phosphatase   153 H  ()  U/L


 


Hep Bs Antibody  Reactive H   (Non-Reactive)  


 


Hep C IgG Ab  Reactive H   (Non-Reactive)  














Assessment and Plan


Plan: 


Hx of Hepatitis C with deranged LFTs, uptrending


- Hepatitis panel reviewed. Hep C Ab reactive


- Likely chronic hepatitis C


- Will obtain EBV and CMV levels to r/o super-infection


- Will consult GI in am for further assistance on Hepatitis C and transaminitis





Abdominal and neck masses


- CT abd and Neck US neg, likely lipomas





Suicidality w/ hx of bipolar


- Management as per Psychiatry





DVT//GI prophylaxis


- Low risk, patient ambulatory


- No indication for GI prophylaxis
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
## 2018-09-20 LAB
ALBUMIN SERPL-MCNC: 4.4 G/DL (ref 3.5–5)
ALP SERPL-CCNC: 176 U/L (ref 38–126)
ALT SERPL-CCNC: 1184 U/L (ref 21–72)
ANION GAP SERPL CALC-SCNC: 9 MMOL/L
APAP SPEC-MCNC: <10 UG/ML
AST SERPL-CCNC: 695 U/L (ref 17–59)
BUN SERPL-SCNC: 13 MG/DL (ref 9–20)
CALCIUM SPEC-MCNC: 9.5 MG/DL (ref 8.4–10.2)
CHLORIDE SERPL-SCNC: 106 MMOL/L (ref 98–107)
CO2 SERPL-SCNC: 26 MMOL/L (ref 22–30)
EBV VCA IGG SER IA-ACNC: >8 AI
GLUCOSE SERPL-MCNC: 128 MG/DL (ref 74–99)
HBV DNA SERPL NAA+PROBE-ACNC: <10 IU/ML (ref ?–10)
HBV DNA SERPL NAA+PROBE-LOG IU: <1 {LOG_IU}/ML (ref ?–1)
POTASSIUM SERPL-SCNC: 4.8 MMOL/L (ref 3.5–5.1)
PROT SERPL-MCNC: 8 G/DL (ref 6.3–8.2)
SALICYLATES SERPL-MCNC: <1 MG/DL
SODIUM SERPL-SCNC: 141 MMOL/L (ref 137–145)

## 2018-09-20 RX ADMIN — NICOTINE SCH PATCH: 14 PATCH, EXTENDED RELEASE TRANSDERMAL at 09:07

## 2018-09-20 RX ADMIN — NICOTINE SCH PATCH: 14 PATCH, EXTENDED RELEASE TRANSDERMAL at 09:08

## 2018-09-20 NOTE — P.CONS
History of Present Illness





- Reason for Consult


Consult date: 09/20/18


elevated liver enzymes


Requesting physician: Fredo Millan





- Chief Complaint


suicidal homicidal ideations 





- History of Present Illness





34 y/o male PMH bipolar depression, IVDA heroin abuse, hepatitis C admitted 

with suicidal homicidal ideation. Consult requested for elevated liver enzymes. 

Liver enzymes range; TB 1.3-1.4. -695. -1184. -176. PT/INR 

not available. WBC 5.4-6.0. HGB 15.4. Platelet 222-241. Liver enzymes 1 year 

ago were within normal limits. 





CT abdomen no acute process; liver, spleen, gallbladder, and pancreas normal; 

bile ducts not dilated.





He reports a known history of hepatitis C without treatment. Hepatitis screen; 

hepatitis A IgM antibody requested. Hepatitis C IgG antibody reactive. 

Hepatitis B surface antibody reactive quantatative 405.9; core and surface 

antigen nonreactive.  Patient is unsure if he was immunized against hepatitis A/

B. Denies consumption of ETOH prior to admission and denies recent heroin usage

; quit several months ago. No new medications or recent tylenol or ASA usage. 

Acetaminophen and salicylate level pending.





Denies fever chills hematemesis hematochezia or melena. No weight loss. No 

recent travels.  





Review of Systems


Constitutional: Denies fever, chills, sweats, weight gain, or loss.


HEENT: Negative for migraines, blurred vision or loss, earaches, drainage, 

tinnitus, oral mucosal lesions, dysphagia, or odynophagia.


Cardiac: Negative for chest pain, arrhythmias, or palpitation.


Respiratory: Negative for shortness of breath, hemoptysis, cough, or sputum 

production.


Gastrointestinal: See HPI for pertinent findings.


Genitourinary: Negative for hematuria, urgency, frequency, polyuria, dysuria, 

or penile discharge.


Musculoskeletal: Negative for muscle aches, swelling, arthritis, and 

arthralgias.  


Neurologic: Negative for stroke or TIA.


Endocrine: Negative for thyroid problems.


Skin: Negative for rash or itching.


Psychiatric: Negative history for depression and anxiety








Past Medical History


Additional Past Medical History / Comment(s): Hepatitic C, HPV


History of Any Multi-Drug Resistant Organisms: None Reported


Past Surgical History: No Surgical Hx Reported


Past Psychological History: Anxiety, Bipolar


Smoking Status: Current every day smoker


Past Alcohol Use History: None Reported


Past Drug Use History: Marijuana





Medications and Allergies


 Allergies











Allergy/AdvReac Type Severity Reaction Status Date / Time


 


No Known Allergies Allergy   Verified 09/17/18 14:23














Physical Exam


Vitals: 


 Vital Signs











  Temp Pulse Resp BP


 


 09/20/18 07:02  97.6 F  85  16  112/58














- Constitutional


General appearance: average body habitus





- EENT


Eyes: normal appearance


ENT: normal oropharynx


Ears: bilateral: normal





- Neck


Neck: normal ROM





- Respiratory


Respiratory: bilateral: CTA





- Cardiovascular


Rhythm: regular


Heart sounds: normal: S1, S2





- Gastrointestinal


very mild right upper quadrant tenderness no rebound or guarding





General gastrointestinal: soft





- Integumentary


Integumentary: normal turgor





- Neurologic


Neurologic: CNII-XII intact





- Musculoskeletal


Musculoskeletal: gait normal





- Psychiatric


Psychiatric: A&O x's 3





Results


CBC & Chem 7: 


 09/19/18 09:54





 09/20/18 08:29


Labs: 


 Abnormal Lab Results - Last 24 Hours (Table)











  09/20/18 Range/Units





  08:29 


 


Glucose  128 H  (74-99)  mg/dL


 


Total Bilirubin  1.4 H  (0.2-1.3)  mg/dL


 


AST  695 H  (17-59)  U/L


 


ALT  1184 H  (21-72)  U/L


 


Alkaline Phosphatase  176 H  ()  U/L











CT scan - abdomen: report reviewed (Dr. Herbert)





Assessment and Plan


(1) Elevated liver enzymes


Narrative/Plan: 


34 y/o male with a known history of hepatitis C remote IVDA heroin abuse 

presents with suicidal homicidal ideation and elevated liver enzymes consistent 

with acute hepatocellular injuy. Suspect viral cause but toxic hepatitis such 

as medications also needs to be considered within the differential. Obstructive 

pathology chronic liver disease felt to be less likely. Elevated liver enzymes 

to this degree are most likely not related to his history of hepatitis C. 


Current Visit: Yes   Status: Acute   Code(s): R74.8 - ABNORMAL LEVELS OF OTHER 

SERUM ENZYMES   SNOMED Code(s): 705706808


   





(2) Hepatitis C antibody positive in blood


Current Visit: Yes   Status: Acute   Code(s): R76.8 - OTHER SPECIFIED ABNORMAL 

IMMUNOLOGICAL FINDINGS IN SERUM   SNOMED Code(s): 006387624


   





(3) Hepatitis B antibody positive


Narrative/Plan: 


Consistent with previous infection or from immunization. 


Current Visit: Yes   Status: Acute   Code(s): R76.8 - OTHER SPECIFIED ABNORMAL 

IMMUNOLOGICAL FINDINGS IN SERUM   SNOMED Code(s): 291931568


   


Plan: 


1. Hepatitis A IgM. Hepatitis C quantatative/genotype requested.


2. HSV, CMV, EBV HIV, acetaminophen, salicylate level requested.


3. US abdomen RUQ.


4.  Daily CMP PT INR.


5. Avoid hepatotoxic medications.


6. Dr. Herbert discussed case and plan of care with Dr. Millan.








Thank you for this kind referral and the opportunity to participate in the care 

of your patient.  This consultation was discussed with Dr. Herbert.  The 

impression and plan of care have been directed as dictated.

## 2018-09-20 NOTE — P.PN
Subjective


Progress Note Date: 09/20/18





Patient was seen and examined at bedside.  The patient is good spirits and 

denied any active complaints including abdominal pain, nausea, vomiting, 

diarrhea, constipation, cough, chest pain, shortness of breath, or fever.





Objective





- Vital Signs


Vital signs: 


 Vital Signs











Temp  97.6 F   09/20/18 07:02


 


Pulse  85   09/20/18 07:02


 


Resp  16   09/20/18 07:02


 


BP  112/58   09/20/18 07:02


 


Pulse Ox  98   09/17/18 18:00














- Exam


General: Non-toxic, in no acute distress


HEENT: NC/AT, anicteric sclerae, moist conjunctiva, no lid-lag, PERRLA, 

oropharynx clear, no erythema, exudates


Cardiovascular: S1/S2 wnl, no murmurs, rubs, or gallops


Lungs: Clear to auscultation, normal respiratory effort, no accessory muscle 

use 


Abdominal: Soft, nontender, non-distended, no guarding, rebound, or rigidity, 

normoactive bowel sounds


Skin: Warm, dry


Extremities: No edema or contractures


Psychiatric: Alert and oriented to person, place and time, appropriate affect, 

Intact judgment   


Neuro: CN II-XII grossly intact, no focal motor deficits





- Labs


CBC & Chem 7: 


 09/19/18 09:54





 09/20/18 08:29


Labs: 


 Abnormal Lab Results - Last 24 Hours (Table)











  09/20/18 09/20/18 Range/Units





  08:29 08:29 


 


Glucose   128 H  (74-99)  mg/dL


 


Total Bilirubin   1.4 H  (0.2-1.3)  mg/dL


 


AST   695 H  (17-59)  U/L


 


ALT   1184 H  (21-72)  U/L


 


Alkaline Phosphatase   176 H  ()  U/L


 


EBV Capsid Ag IgG Intrp  POSITIVE H   (NEGATIVE)  


 


EBV Nuc Ag IgG Interp  POSITIVE H   (NEGATIVE)  














Assessment and Plan


Plan: 


Hx of Hepatitis C with deranged LFTs, uptrending


- Hepatitis panel reviewed. Hep C Ab reactive


- Likely chronic hepatitis C


- GI consulted, recommendations appreciated


- Will obtain remaining hepatitis panel


- EBV IgG likely represents a history of exposure





Abdominal and neck masses


- CT abd and Neck US neg, likely lipomas





Suicidality w/ hx of bipolar


- Management as per Psychiatry





DVT//GI prophylaxis


- Low risk, patient ambulatory


- No indication for GI prophylaxis
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
## 2018-09-21 LAB
ALBUMIN SERPL-MCNC: 4.1 G/DL (ref 3.5–5)
ALP SERPL-CCNC: 160 U/L (ref 38–126)
ALT SERPL-CCNC: 947 U/L (ref 21–72)
ANION GAP SERPL CALC-SCNC: 9 MMOL/L
AST SERPL-CCNC: 463 U/L (ref 17–59)
BUN SERPL-SCNC: 11 MG/DL (ref 9–20)
CALCIUM SPEC-MCNC: 9.2 MG/DL (ref 8.4–10.2)
CHLORIDE SERPL-SCNC: 106 MMOL/L (ref 98–107)
CO2 SERPL-SCNC: 27 MMOL/L (ref 22–30)
GLUCOSE SERPL-MCNC: 88 MG/DL (ref 74–99)
HCV RNA SERPL NAA+PROBE-LOG IU: 5.62 {LOG_IU}/ML (ref ?–1.08)
INR PPP: 1.1 (ref ?–1.2)
POTASSIUM SERPL-SCNC: 4.4 MMOL/L (ref 3.5–5.1)
PROT SERPL-MCNC: 7.5 G/DL (ref 6.3–8.2)
PT BLD: 10.9 SEC (ref 9–12)
SODIUM SERPL-SCNC: 142 MMOL/L (ref 137–145)

## 2018-09-21 RX ADMIN — NICOTINE SCH PATCH: 14 PATCH, EXTENDED RELEASE TRANSDERMAL at 08:08
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
## 2018-09-21 NOTE — P.PN
Subjective


Progress Note Date: 09/21/18


Principal diagnosis: 


Hepatitis





LFTs improved.  Feels well.  Denies abdominal pain.  Afebrile.  Total bilirubin 

1.2.  .  .  .  Ultrasound no worrisome intrahepatic or 

intrahepatic ductal dilatation/ masses seen.  No gallstones.  Viral studies 

reviewed and consistent with previous exposure.  Hepatitis A IgM pending.  

Acetaminophen less than 10.








Objective





- Vital Signs


Vital signs: 


 Vital Signs











Temp  97.7 F   09/21/18 06:44


 


Pulse  73   09/21/18 06:44


 


Resp  16   09/21/18 06:44


 


BP  119/58   09/21/18 06:44


 


Pulse Ox  98   09/17/18 18:00














- Exam


General appearance: The patient is alert, oriented, in no acute distress.


HET: Head is normocephalic and atraumatic.  Pupils are equal and reactive.  

Oropharynx is clear without lesions.


Neck: Supple without lymphadenopathy.  Trachea midline.


Heart: S1 S2.  Regular rate and rhythm.


Lungs: No crackles or wheezes are heard.


Abdomen: Soft, nontender, nondistended with  bowel sounds.  No peritoneal 

signs.  No palpable organomegaly or masses.


Extremities: Normal skin color and turgor.  No cyanosis, rash, ulceration, 

clubbing, or edema.  Radial and pedal pulses are 2/4 bilaterally.


Neurological: No focal deficits.  Strength and sensation are grossly intact.








- Labs


CBC & Chem 7: 


 09/19/18 09:54





 09/21/18 08:26


Labs: 


 Abnormal Lab Results - Last 24 Hours (Table)











  09/20/18 09/21/18 Range/Units





  08:29 08:26 


 


AST   463 H  (17-59)  U/L


 


ALT   947 H  (21-72)  U/L


 


Alkaline Phosphatase   160 H  ()  U/L


 


EBV Capsid Ag IgG Intrp  POSITIVE H   (NEGATIVE)  


 


EBV Nuc Ag IgG Interp  POSITIVE H   (NEGATIVE)  














Assessment and Plan


(1) Elevated liver enzymes


Narrative/Plan: 


34 y/o male with a known history of hepatitis C remote IVDA heroin abuse 

presents with suicidal homicidal ideation and elevated liver enzymes consistent 

with acute hepatocellular injuy. Suspect viral cause but toxic hepatitis such 

as medications also needs to be considered within the differential. Obstructive 

pathology chronic liver disease felt to be less likely. Elevated liver enzymes 

to this degree are most likely not related to his history of hepatitis C. 


Current Visit: Yes   Status: Acute   Code(s): R74.8 - ABNORMAL LEVELS OF OTHER 

SERUM ENZYMES   SNOMED Code(s): 541425692


   





(2) Hepatitis C antibody positive in blood


Current Visit: Yes   Status: Acute   Code(s): R76.8 - OTHER SPECIFIED ABNORMAL 

IMMUNOLOGICAL FINDINGS IN SERUM   SNOMED Code(s): 860474206


   





(3) Hepatitis B antibody positive


Narrative/Plan: 


Consistent with previous infection or from immunization. 


Current Visit: Yes   Status: Acute   Code(s): R76.8 - OTHER SPECIFIED ABNORMAL 

IMMUNOLOGICAL FINDINGS IN SERUM   SNOMED Code(s): 730765002


   


Plan: 


1.  Continue supportive measures.  Discharge per psychiatry and medicine.  

Return to office after discharge for reevaluation and discussion of possible 

hepatitis C treatment.  Repeat CMP in the outpatient setting 5-7 days.








Assessment and plan a care discussed with Dr. Herbert

## 2018-09-21 NOTE — US
EXAMINATION TYPE: US abdomen limited

 

DATE OF EXAM: 9/21/2018

 

COMPARISON: CT abdomen and pelvis from 3 days ago.

 

CLINICAL HISTORY: elevated liver enzymes.

 

EXAM MEASUREMENTS:

 

Liver Length:  11.8 cm   

Gallbladder Wall:  0.3 cm   

CBD:  0.2 cm

Right Kidney:  10.2 x 4.8 x 5.1 cm

 

 

 

Pancreas:  body and tail obscured by overlying bowel gas

Liver:  Mildly heterogeneous  

Gallbladder:  wnl

**Evidence for sonographic Harvey's sign:  No

CBD:  wnl 

Right Kidney:  No hydronephrosis or masses seen 

 

 

 

IMPRESSION: No worrisome intrahepatic mass or intrahepatic ductal dilatation is seen. Mild heterogene
ous appearance could reflect product of diffuse fatty infiltration or underlying hepatocellular disea
se. Spleen is noted mildly enlarged on recent CT coronal image 48 measuring 13.5 cm long axis.

## 2018-09-22 RX ADMIN — NICOTINE SCH PATCH: 14 PATCH, EXTENDED RELEASE TRANSDERMAL at 08:13
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
## 2018-09-23 RX ADMIN — NICOTINE SCH PATCH: 14 PATCH, EXTENDED RELEASE TRANSDERMAL at 08:00

## 2018-09-23 NOTE — P.PN
Progress Note - Text





Interval history: The patient is found in the library he follows me to an 

interview room.  He states that his mood is fine.  He is very much looking 

forward to being discharged.  He doesn't feel staying here any longer will help 

him.  We reviewed some of his lab results.  He does have ongoing hepatitis C 

and we discussed the need for him to follow up as an outpatient regarding that 

infection.  He reports he did sleep last night appetite stable.  He is 

anticipating a visit with his mother today.  He finds himself frustrated that 

he is on the same unit with the female peer at that he has concern about.  He 

was offered the opportunity to be transferred to another facility but he 

declined.





Mental status exam: The patient is alert he is dressed in his own clothing.  He 

is pleasant cooperative.  He is verbose but nonpressured.  He is dressed in a T-

shirt and sweat pants.  He has visible tattoos.  Eye contact is appropriate.  

He reports his mood is improving although he wants to be discharged.  He is 

reporting no suicidal or homicidal ideation intent or plan.  He is reporting no 

auditory or visual hallucinations or any specific delusions.  There is no 

observed evidence of psychosis hypomania or cuca.  He can be circumstantial at 

times but no tangential thinking loose associations or flight of ideas 

observed.  Insight and judgment improving.  He is oriented to person place and 

date.  Affect is appropriately expresses including appropriate smiling.  





Plan: The patient will continue on his current psychotropic medication.  We 

will continue to monitor him for safety he is encouraged to participate in the 

milieu although he describes having discomfort attending groups with the 

aforementioned female peer.  Vital signs reviewed.

## 2018-09-24 VITALS — TEMPERATURE: 97.4 F | DIASTOLIC BLOOD PRESSURE: 85 MMHG | HEART RATE: 82 BPM | SYSTOLIC BLOOD PRESSURE: 132 MMHG

## 2018-09-24 LAB
HSV1 IGG SER IA-ACNC: 36.9
HSV1+2 IGM SER IA-ACNC: 1.58 INDEX (ref ?–0.9)
HSV2 IGG SER IA-ACNC: 0.09

## 2018-09-24 RX ADMIN — NICOTINE SCH PATCH: 14 PATCH, EXTENDED RELEASE TRANSDERMAL at 09:04

## 2018-09-24 NOTE — P.DS
Providers


Date of admission: 


09/17/18 17:53





Attending physician: 


Fredo Millan DO





Consults: 





 





09/18/18 13:25


Consult Physician Routine 


   Consulting Provider: [facility]


   Consult Reason/Comments: H &P


   Do you want consulting provider notified?: Already Contacted





09/20/18 11:16


Consult Physician Routine 


   Consulting Provider: Dani Herbert Reason/Comments: Increased Liver enzymes


   Do you want consulting provider notified?: Yes











Primary care physician: 


Stated None








- Discharge Diagnosis(es)


(1) Bipolar 1 disorder with moderate cuca


Current Visit: Yes   Status: Acute   Priority: Low   





(2) Elevated liver enzymes


Current Visit: Yes   Status: Acute   Priority: Low   





(3) Hepatitis C antibody positive in blood


Current Visit: Yes   Status: Acute   Priority: Low   





Plan - Discharge Summary


Discharge Rx Participant: Yes


Follow up Appointment(s)/Referral(s): 


Stromberg,Reid, MD [STAFF PHYSICIAN] - 1-2 days


Kristy Meza PAC [REFERRING] - 10/18/18 9:00 am


Discharge Disposition: HOME SELF-CARE

## 2018-11-29 ENCOUNTER — HOSPITAL ENCOUNTER (OUTPATIENT)
Dept: HOSPITAL 47 - LABWHC1 | Age: 33
Discharge: HOME | End: 2018-11-29
Attending: PHYSICIAN ASSISTANT
Payer: COMMERCIAL

## 2018-11-29 DIAGNOSIS — R74.8: Primary | ICD-10-CM

## 2018-11-29 LAB
INR PPP: 1.1 (ref ?–1.2)
PT BLD: 10.3 SEC (ref 9–12)

## 2018-11-29 PROCEDURE — 87522 HEPATITIS C REVRS TRNSCRPJ: CPT

## 2018-11-29 PROCEDURE — 36415 COLL VENOUS BLD VENIPUNCTURE: CPT

## 2018-11-29 PROCEDURE — 85610 PROTHROMBIN TIME: CPT

## 2018-11-29 PROCEDURE — 80076 HEPATIC FUNCTION PANEL: CPT

## 2018-11-29 PROCEDURE — 82565 ASSAY OF CREATININE: CPT

## 2018-11-30 LAB
ALBUMIN SERPL-MCNC: 4.5 G/DL (ref 3.8–4.9)
ALBUMIN/GLOB SERPL: 1.8 G/DL (ref 1.2–2.1)
ALP SERPL-CCNC: 223 U/L (ref 41–126)
ALT SERPL-CCNC: 63 U/L (ref 10–49)
AST SERPL-CCNC: 54 U/L (ref 14–35)
BILIRUB INDIRECT SERPL-MCNC: 0.3 MG/DL
GLOBULIN SER CALC-MCNC: 2.5 G/DL (ref 2.1–3.7)
HCV RNA SPEC NAA+PROBE-ACNC: <12 IU/ML (ref ?–12)
HCV RNA SPEC NAA+PROBE-LOG#: <1.08 {LOG_COPIES}/ML (ref ?–1.08)
PROT SERPL-MCNC: 7 G/DL (ref 6.2–8.2)

## 2019-08-02 ENCOUNTER — HOSPITAL ENCOUNTER (INPATIENT)
Dept: HOSPITAL 47 - EC | Age: 34
LOS: 7 days | Discharge: HOME | DRG: 885 | End: 2019-08-09
Attending: PSYCHIATRY & NEUROLOGY | Admitting: PSYCHIATRY & NEUROLOGY
Payer: MEDICAID

## 2019-08-02 VITALS — BODY MASS INDEX: 31.4 KG/M2

## 2019-08-02 DIAGNOSIS — Z62.810: ICD-10-CM

## 2019-08-02 DIAGNOSIS — Z79.899: ICD-10-CM

## 2019-08-02 DIAGNOSIS — M47.9: ICD-10-CM

## 2019-08-02 DIAGNOSIS — F31.30: Primary | ICD-10-CM

## 2019-08-02 DIAGNOSIS — B18.2: ICD-10-CM

## 2019-08-02 DIAGNOSIS — M54.30: ICD-10-CM

## 2019-08-02 DIAGNOSIS — F12.10: ICD-10-CM

## 2019-08-02 DIAGNOSIS — F17.210: ICD-10-CM

## 2019-08-02 DIAGNOSIS — R45.851: ICD-10-CM

## 2019-08-02 DIAGNOSIS — F90.9: ICD-10-CM

## 2019-08-02 DIAGNOSIS — Z91.5: ICD-10-CM

## 2019-08-02 DIAGNOSIS — F41.0: ICD-10-CM

## 2019-08-02 PROCEDURE — 72100 X-RAY EXAM L-S SPINE 2/3 VWS: CPT

## 2019-08-02 PROCEDURE — 82075 ASSAY OF BREATH ETHANOL: CPT

## 2019-08-02 PROCEDURE — 83036 HEMOGLOBIN GLYCOSYLATED A1C: CPT

## 2019-08-02 PROCEDURE — 96372 THER/PROPH/DIAG INJ SC/IM: CPT

## 2019-08-02 PROCEDURE — 99285 EMERGENCY DEPT VISIT HI MDM: CPT

## 2019-08-02 PROCEDURE — 81001 URINALYSIS AUTO W/SCOPE: CPT

## 2019-08-02 PROCEDURE — 85025 COMPLETE CBC W/AUTO DIFF WBC: CPT

## 2019-08-02 PROCEDURE — 80306 DRUG TEST PRSMV INSTRMNT: CPT

## 2019-08-02 PROCEDURE — 80164 ASSAY DIPROPYLACETIC ACD TOT: CPT

## 2019-08-02 PROCEDURE — 80061 LIPID PANEL: CPT

## 2019-08-02 PROCEDURE — 84443 ASSAY THYROID STIM HORMONE: CPT

## 2019-08-02 PROCEDURE — 80053 COMPREHEN METABOLIC PANEL: CPT

## 2019-08-02 RX ADMIN — ACETAMINOPHEN PRN MG: 325 TABLET, FILM COATED ORAL at 20:56

## 2019-08-02 NOTE — XR
Spine

 

HISTORY: Pain

 

3 views of the lumbar spine loss of disc height present L5-S1. Lumbar vertebral bodies show preserved
 height, alignment, and bone mineralization.

 

IMPRESSION: Loss of disc height may be due to disc desiccation, consider degenerative disc disease.
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
## 2019-08-03 LAB
ALBUMIN SERPL-MCNC: 4.2 G/DL (ref 3.5–5)
ALP SERPL-CCNC: 88 U/L (ref 38–126)
ALT SERPL-CCNC: 124 U/L (ref 21–72)
ANION GAP SERPL CALC-SCNC: 8 MMOL/L
AST SERPL-CCNC: 68 U/L (ref 17–59)
BASOPHILS # BLD AUTO: 0.1 K/UL (ref 0–0.2)
BASOPHILS NFR BLD AUTO: 1 %
BUN SERPL-SCNC: 16 MG/DL (ref 9–20)
CALCIUM SPEC-MCNC: 9.5 MG/DL (ref 8.4–10.2)
CHLORIDE SERPL-SCNC: 104 MMOL/L (ref 98–107)
CHOLEST SERPL-MCNC: 129 MG/DL (ref ?–200)
CO2 SERPL-SCNC: 27 MMOL/L (ref 22–30)
EOSINOPHIL # BLD AUTO: 0.2 K/UL (ref 0–0.7)
EOSINOPHIL NFR BLD AUTO: 3 %
ERYTHROCYTE [DISTWIDTH] IN BLOOD BY AUTOMATED COUNT: 5.35 M/UL (ref 4.3–5.9)
ERYTHROCYTE [DISTWIDTH] IN BLOOD: 14.8 % (ref 11.5–15.5)
GLUCOSE SERPL-MCNC: 95 MG/DL (ref 74–99)
HBA1C MFR BLD: 5.3 % (ref 4–6)
HCT VFR BLD AUTO: 46.4 % (ref 39–53)
HDLC SERPL-MCNC: 46 MG/DL (ref 40–60)
HGB BLD-MCNC: 15.3 GM/DL (ref 13–17.5)
LDLC SERPL CALC-MCNC: 72 MG/DL (ref 0–99)
LYMPHOCYTES # SPEC AUTO: 2 K/UL (ref 1–4.8)
LYMPHOCYTES NFR SPEC AUTO: 26 %
MCH RBC QN AUTO: 28.6 PG (ref 25–35)
MCHC RBC AUTO-ENTMCNC: 33 G/DL (ref 31–37)
MCV RBC AUTO: 86.8 FL (ref 80–100)
MONOCYTES # BLD AUTO: 0.6 K/UL (ref 0–1)
MONOCYTES NFR BLD AUTO: 8 %
NEUTROPHILS # BLD AUTO: 4.7 K/UL (ref 1.3–7.7)
NEUTROPHILS NFR BLD AUTO: 60 %
PLATELET # BLD AUTO: 222 K/UL (ref 150–450)
POTASSIUM SERPL-SCNC: 4.6 MMOL/L (ref 3.5–5.1)
PROT SERPL-MCNC: 7.3 G/DL (ref 6.3–8.2)
SODIUM SERPL-SCNC: 139 MMOL/L (ref 137–145)
TRIGL SERPL-MCNC: 57 MG/DL (ref ?–150)
WBC # BLD AUTO: 7.8 K/UL (ref 3.8–10.6)

## 2019-08-03 RX ADMIN — ACETAMINOPHEN PRN MG: 325 TABLET, FILM COATED ORAL at 08:40

## 2019-08-03 RX ADMIN — ACETAMINOPHEN PRN MG: 325 TABLET, FILM COATED ORAL at 16:25

## 2019-08-03 RX ADMIN — NICOTINE SCH PATCH: 21 PATCH, EXTENDED RELEASE TRANSDERMAL at 08:40

## 2019-08-03 RX ADMIN — NAPROXEN PRN MG: 250 TABLET ORAL at 15:34
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
## 2019-08-04 LAB
PH UR: 7 [PH] (ref 5–8)
SP GR UR: 1.02 (ref 1–1.03)
UROBILINOGEN UR QL STRIP: <2 MG/DL (ref ?–2)

## 2019-08-04 RX ADMIN — ACETAMINOPHEN PRN MG: 325 TABLET, FILM COATED ORAL at 20:55

## 2019-08-04 RX ADMIN — NAPROXEN PRN MG: 250 TABLET ORAL at 20:53

## 2019-08-04 RX ADMIN — NAPROXEN PRN MG: 250 TABLET ORAL at 09:25

## 2019-08-04 RX ADMIN — NICOTINE SCH PATCH: 21 PATCH, EXTENDED RELEASE TRANSDERMAL at 09:21

## 2019-08-04 RX ADMIN — ACETAMINOPHEN PRN MG: 325 TABLET, FILM COATED ORAL at 09:23

## 2019-08-04 NOTE — P.PN
Progress Note - Text





Interval history: The patient is found in the hallway follows me to an interview

room.  He states that he called CPS on his girlfriend as he found out she 

relapsed on illicit drugs with the children in the home.  He states that they 

are all now at [facility] for treatment.  He states that's he is doing well with 

the Lamictal.  He is reporting no side effects from medication including skin 

rash.  He is eating adequately.  He's been attending some groups.  Staff 

recorded he slept 4 hours last night.  He indicates he is hoping to be dis

charged soon.





Mental status exam: The patient is a stocky  male appearing his stated 

age.  Eye contacts appropriate speech is fluent spontaneous nonpressured.  He 

reports his mood is good he denies having any suicidal or homicidal ideation 

intent or plan.  He is reporting no auditory or visual hallucinations or any 

specific delusions.  Thought process is linear he can be circumstantial at 

times.  Insight and judgment grossly intact.  He remains oriented to person 

place and date.  He demonstrates no verbal or physical aggressiveness.





Plan: Resolving suicidal ideation.  The patient will continue on Lamictal we 

will titrate the dose further while hospitalized.  He informs me that he has 

signed a notice to withdrawal his voluntary status.  We will continue to monitor

him for safety and encourage participation in the milieu.  Vital signs reviewed.

## 2019-08-05 RX ADMIN — NAPROXEN PRN MG: 250 TABLET ORAL at 20:27

## 2019-08-05 RX ADMIN — NAPROXEN PRN MG: 250 TABLET ORAL at 10:22

## 2019-08-05 RX ADMIN — ACETAMINOPHEN PRN MG: 325 TABLET, FILM COATED ORAL at 10:22

## 2019-08-05 RX ADMIN — ACETAMINOPHEN PRN MG: 325 TABLET, FILM COATED ORAL at 20:28

## 2019-08-05 RX ADMIN — NICOTINE SCH PATCH: 21 PATCH, EXTENDED RELEASE TRANSDERMAL at 08:16
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
## 2019-08-06 RX ADMIN — NAPROXEN PRN MG: 250 TABLET ORAL at 21:11

## 2019-08-06 RX ADMIN — ACETAMINOPHEN PRN MG: 325 TABLET, FILM COATED ORAL at 21:08

## 2019-08-06 RX ADMIN — NICOTINE SCH PATCH: 21 PATCH, EXTENDED RELEASE TRANSDERMAL at 08:02

## 2019-08-06 RX ADMIN — NAPROXEN PRN MG: 250 TABLET ORAL at 08:03

## 2019-08-06 NOTE — P.PN
Progress Note - Text


Progress Note Date: 08/06/19





Interval History:


Patient was seen this morning wandering the hallways and was agreeable to speak.

 Patient states that she is gradually improving in terms of his aggression and 

his mood.  He claims that he feels more stable on medications and when he is 

away from his stressors.  He states that he is still trying to follow-up with a 

CPS report and contact his girlfriend and children at [facility] however is

not able to get through to them.  Patient states that he is not having a rash or

any kind of reaction to the medications.  Patient states that he would like to 

move to [facility] here on within the next few months as he is not able to have any 

kind of transportation in [place] which is troublesome for him as he cannot 

work.  He states that he is sleeping well throughout the night and denies any 

problems with appetite. At this time patient denies any suicidal or homical 

ideations, intent or plan. Patient denies any auditory, visual hallucinations 

and denies any paranoia or delusions. Patient denies any side effects from the 

medications and has been compliant with meds. 





Mental Status Exam:


General Appearance: Patient appears to be stated age is alert, pleasant, and 

cooperative.  Patient was in hospital gown and had marginal hygiene and 

grooming.


Behavior: Patient is calmly seated without any agitated behavior.


Speech: Patient's speech is fluent and nonpressured. 


Mood/Affect: Patient reports their mood is improving, affect is congruent and 

constricted. 


Suicidality/Homicidality:  Patient denies having any suicidal or homicidal 

ideation intent or plan.  


Perceptions: Patient denies any auditory or visual hallucinations.  


Though content/process: There is no evidence of any delusional thought content 

and thought process is linear and goal-directed.  Patient is preoccupied with 

finances


Memory and concentration: AOX3, grossly intact for the purposes of this session


Judgment and insight: Superficial insight poor judgment





Assessment


Bipolar disorder, depressed rule out substance-induced mood disorder.


Cannabis use disorder





Plan:


-Patient continues to meet criteria for inpatient psychiatric admission for 

symptom stabilization and safety.  Patient has signed a voluntary form and would

like to stay in the hospital to receive treatment.


-Medications: Continue Lamictal to 25 mg daily at bedtime + 50 mg daily for mood

stabilization and aggression.  Patient does not demonstrate a rash at this time 

and will continue to monitor daily.  Would like to increase to 100 mg total 

daily prior to patient being discharged.


-When necessary Ativan and Geodon for agitation/aggression.


-SW on board for discharge planning.  At this time patient does not have a clear

discharge plan however does have a house that he is renting and owes money to 

his landlord.  Patient does not need to return back to custodial at this time or 

after discharge.

## 2019-08-07 RX ADMIN — ARIPIPRAZOLE ONE MG: KIT at 12:31

## 2019-08-07 RX ADMIN — ACETAMINOPHEN PRN MG: 325 TABLET, FILM COATED ORAL at 08:21

## 2019-08-07 RX ADMIN — ACETAMINOPHEN PRN MG: 325 TABLET, FILM COATED ORAL at 15:10

## 2019-08-07 RX ADMIN — NAPROXEN PRN MG: 250 TABLET ORAL at 20:21

## 2019-08-07 RX ADMIN — NICOTINE SCH PATCH: 21 PATCH, EXTENDED RELEASE TRANSDERMAL at 08:20

## 2019-08-07 RX ADMIN — ARIPIPRAZOLE ONE: KIT at 13:14

## 2019-08-07 RX ADMIN — ACETAMINOPHEN PRN MG: 325 TABLET, FILM COATED ORAL at 20:20

## 2019-08-07 RX ADMIN — NAPROXEN PRN MG: 250 TABLET ORAL at 08:20

## 2019-08-07 NOTE — P.PN
Progress Note - Text


Progress Note Date: 08/07/19





Interval History:


Patient was seen today and was agreeable to speak to writer in the office.  Mingo curry states that his aggressiveness and mood are gradually improving.  He 

states that he is getting along well with others on the unit and trying to 

attend groups and "get something out of it".  Patient states that he is trying 

to work on his living arrangement and following up with CPS case which she filed

against his girlfriend.  Patient denies any rash and showed right or his arms 

and legs and no rash was visible upon examination.  Patient states that he is 

having some sciatica hip pain and is finding it difficult to walk at this time 

however states that this is chronic and usually subsides. At this time patient 

denies any suicidal or homical ideations, intent or plan. Patient denies any 

auditory, visual hallucinations and denies any paranoia or delusions. Patient 

denies any side effects from the medications and has been compliant with meds.  

Patient did sign an AMA form yesterday however claims that he would like to 

rescind it now.





Mental Status Exam:


General Appearance: Patient appears to be stated age is alert, pleasant, and 

cooperative.  Patient was in hospital gown and had marginal hygiene and 

grooming.


Behavior: Patient is calmly seated without any agitated behavior.


Speech: Patient's speech is fluent and nonpressured. 


Mood/Affect: Patient reports their mood is improving, affect is congruent and 

constricted. 


Suicidality/Homicidality:  Patient denies having any suicidal or homicidal 

ideation intent or plan.  


Perceptions: Patient denies any auditory or visual hallucinations.  


Though content/process: There is no evidence of any delusional thought content 

and thought process is linear and goal-directed. 


Memory and concentration: AOX3, grossly intact for the purposes of this session


Judgment and insight: Superficial insight poor judgment





Assessment


Bipolar disorder, depressed rule out substance-induced mood disorder.


Cannabis use disorder





Plan:


-Patient continues to meet criteria for inpatient psychiatric admission for 

symptom stabilization and safety.  Patient today has rescinded his AMA form as 

he is willing to continue in treatment.


-Medications: Increase Lamictal to 50 mg daily twice a day for mood 

stabilization and aggression.  Patient does not demonstrate a rash at this time 

and will continue to monitor daily. 


-When necessary Ativan and Geodon for agitation/aggression.


-SW on board for discharge planning.  At this time patient does not have a clear

discharge plan however does have a house that he is renting and owes money to 

his landlord.  Patient does not need to return back to detention at this time or 

after discharge.  Likely discharge date Friday morning.

## 2019-08-08 VITALS — RESPIRATION RATE: 18 BRPM

## 2019-08-08 RX ADMIN — ACETAMINOPHEN PRN MG: 325 TABLET, FILM COATED ORAL at 08:17

## 2019-08-08 RX ADMIN — ACETAMINOPHEN PRN MG: 325 TABLET, FILM COATED ORAL at 14:03

## 2019-08-08 RX ADMIN — NAPROXEN PRN MG: 250 TABLET ORAL at 14:01

## 2019-08-08 RX ADMIN — ACETAMINOPHEN PRN MG: 325 TABLET, FILM COATED ORAL at 20:50

## 2019-08-08 RX ADMIN — NAPROXEN PRN MG: 250 TABLET ORAL at 08:16

## 2019-08-08 RX ADMIN — NICOTINE SCH PATCH: 21 PATCH, EXTENDED RELEASE TRANSDERMAL at 08:15

## 2019-08-08 RX ADMIN — ACETAMINOPHEN PRN MG: 325 TABLET, FILM COATED ORAL at 00:45

## 2019-08-08 RX ADMIN — NAPROXEN PRN MG: 250 TABLET ORAL at 20:52

## 2019-08-08 NOTE — P.PN
Progress Note - Text


Progress Note Date: 08/08/19





Interval History:


Patient was seen in his room this morning and was agreeable to seek to writer.  

Patient appeared to continue to be in some pain from his sciatica however states

that it is mildly improving.  He claims that he goes to an outside chiropractor 

which really helps him with this.  Patient claims that he is tolerating his much

the Lamictal well and claims that is helping him with his aggressiveness, 

impulsivity and his mood.  He states that being in the hospital is helping him 

stay calmer and allowing him to sort out things on the outside which she has to 

get back to.  He spoke of tomorrow going to clean up his house as CPS workers 

are going to do a home evaluation He denies any rashes at this time and shows 

writer's arms and legs which were briefly examined.At this time patient denies 

any suicidal or homical ideations, intent or plan. Patient denies any auditory, 

visual hallucinations and denies any paranoia or delusions. Patient denies any 

side effects from the medications and has been compliant with meds. 





Mental Status Exam:


General Appearance: Patient appears to be stated age is alert, pleasant, and 

cooperative.  Patient was in hospital gown and had marginal hygiene and 

grooming.


Behavior: Patient is calmly seated without any agitated behavior.


Speech: Patient's speech is fluent and nonpressured. 


Mood/Affect: Patient reports their mood is improving, affect is congruent and 

constricted. 


Suicidality/Homicidality:  Patient denies having any suicidal or homicidal 

ideation intent or plan.  


Perceptions: Patient denies any auditory or visual hallucinations.  


Though content/process: There is no evidence of any delusional thought content 

and thought process is linear and goal-directed. 


Memory and concentration: AOX3, grossly intact for the purposes of this session


Judgment and insight: Improving





Assessment


Bipolar disorder, depressed rule


Cannabis use disorder





Plan:


-Patient continues to meet criteria for inpatient psychiatric admission for 

symptom stabilization and safety.  Patient continues to be under voluntary 

status for admission.  


-Medications: Continue with Lamictal to 50 mg daily twice a day for mood 

stabilization and aggression.  Patient does not demonstrate a rash at this time 

and will continue to monitor daily. 


-When necessary Ativan and Geodon for agitation/aggression.


-SW on board for discharge planning.  Likely discharge date Friday morning.  

Patient plans to head back home after discharge to clean his place in preparatio

n for CPS 's to do home inspection.  Patient states that he can get 

act up from a friend or take a bus home.

## 2019-08-09 VITALS — DIASTOLIC BLOOD PRESSURE: 54 MMHG | TEMPERATURE: 97.5 F | SYSTOLIC BLOOD PRESSURE: 92 MMHG | HEART RATE: 59 BPM

## 2019-08-09 RX ADMIN — NAPROXEN PRN MG: 250 TABLET ORAL at 08:51

## 2019-08-09 RX ADMIN — NICOTINE SCH PATCH: 21 PATCH, EXTENDED RELEASE TRANSDERMAL at 08:50

## 2019-08-09 RX ADMIN — ACETAMINOPHEN PRN MG: 325 TABLET, FILM COATED ORAL at 08:52

## 2019-08-09 NOTE — P.DS
Providers


Date of admission: 


08/02/19 19:05





Expected date of discharge: 08/09/19


Attending physician: 


Ross Vidal MD





Consults: 





                                        





08/02/19 19:08


Consult Physician Routine 


   Consulting Provider: [facility]


   Consult Reason/Comments: medical management


   Do you want consulting provider notified?: Yes











Primary care physician: 


Stated None








- Discharge Diagnosis(es)


(1) Bipolar 1 disorder, depressed


Current Visit: Yes   Status: Acute   Priority: High   





(2) Cannabis abuse


Current Visit: Yes   Status: Acute   


Hospital Course: 





Summary of admission note:


Patient is a 33-year-old single  male who was admitted to the mental 

health unit with suicidal ideation. The patient was admitted from the [facility].  He 

apparently had a verbal altercation with his mother in the evening.  She had 

called the police and after their evaluation he was arrested for domestic 

violence.  He was brought to the [facility].  He was seen by clinician in the [facility] and

he was petitioned to go to the hospital for evaluation.  He states he has been 

increasingly depressed and overwhelmed.  He reports hopelessness thinking.  He 

states he wanted to "just give up".  He has been feeling overwhelmed with 

financial burden.  He had argued with his mother over money.  He felt that she 

was helping his sibling more than him and his sibling did not need help.  He 

described having significant difficulty with sleep and energy have been "way 

low".  Appetite had been fluctuating.  He endorses no tearfulness or crying 

spells.  He feels more anxious due to his psychosocial stressors.  He reports 

history of infrequent panic attacks.  In terms of manic episodes he states that 

he will frequently have times where he has unexplained energy he will recklessly

spending money he will be irritable and angry and his sleep will be decreased.  

These episodes don't last for more than 1-2 days however but they occur 

frequently.  He indicates that when he was here last he was given Lamictal at 

100 mg and once it had time to work these symptoms appeared to be improved.  He 

reported no side effects with the Lamictal.  He reports no auditory or visual 

hallucinations or any specific delusions.  He reports no ownership or immediate 

access to firearms.





Hospital course:


Upon admission to the unit patient was initially irritable, depressed affect 

however was directable. Patient got along well with other patients on the unit 

and followed unit protocol.  Patient was compliant with his medications and 

denied any side effects throughout his hospital course.  Patient was checked 

routinely for any rashes or any medical problems associated with his m

edications.  Patient was started on Lamictal and increase to 50 mg twice a day 

for mood stabilization and aggression/irritability.  Patient spoke of his 

stressors and engaged in therapy both group and individual. Patient was also 

seen by medical team for history and physical exam, of note patient had elevated

LFTs on initial blood work and claimed that it was due to previously being 

diagnosed with hepatitis C which he claims he received treatment for in the 

past. Throughout the course of the hospitalization patient gradually improved 

with regards to mood, aggression and irritability and became future oriented and

hopeful. On the day of discharge patient denied any suicidal or homicidal 

ideations intent or plan denied any auditory or visual hallucinations.  He 

states he slept better and has a good appetite.  Patient denied any paranoia and

did not endorse any delusions.  Patient does have a significant history for 

cannabis abuse and was counseled on abstaining from this and discussed with him 

the implications on his physical and mental health.  Patient was also counseled 

on the medications and need for regular compliance and was encouraged to follow-

up with their outpatient appointment for mental health and also for primary 

care.





Mental status exam:


General Appearance: Patient appears to be stated age is alert, pleasant, and 

cooperative. Patient is in no acute distress and has fair hygiene and grooming


Behavior: Patient is calmly seated without any agitated behavior.


Speech: Patient's speech is fluent and nonpressured. 


Mood/Affect: Patient reports their mood is good, affect is congruent and 

euthymic. 


Suicidality/Homicidality:  Patient denies having any suicidal or homicidal 

ideation intent or plan.  


Perceptions: Patient denies any auditory or visual hallucinations.  


Though content/process: There is no evidence of any delusional thought content 

and thought process is linear and goal-directed. 


Memory and concentration: AOX3, grossly intact for the purposes of this session.

Can spell "WORLD" backwards correctly.


Judgment and insight: fair, improved





Impression:


Bipolar disorder, depressed


Cannabis use disorder





Plan:


-Continue with discharge today as patient has improved and stabilized 

psychiatrically and no longer remains an imminent threat to himself and/or 

others.


-Continue medications: Continue with Lamictal 100 mg a day total dose for mood 

stabilization and aggression/irritability.  Patient was advised and warned about

the potential side effect of a rash which may develop and to continue to monitor

for it.  Patient was advised to seek urgent medical medical attention if this 

occurs


-Patient was counseled on the need for medication compliance and appropriate 

follow-up at mental health and also primary care for medical issues.  Patient 

verbalized understanding and agreed.


-Social work to give patient resources for substance use.  Patient also 

requested assistance with transportation home.


-Patient called CPS on his girlfriend, patient followed up with CPS who claims 

that they will do a home visit this week and further investigation.


-Patient counseled on abstaining from recreational drugs and marijuana and 

alcohol.  Discussed the implications of these recreational drugs on his physical

and mental health.  Patient verbally agreed and understood.


-Patient was instructed to return to the hospital or seek immediate medical care

if their psychiatric or medical systems do worsen or reoccur.








                                    Allergies











Allergy/AdvReac Type Severity Reaction Status Date / Time


 


No Known Allergies Allergy   Verified 08/02/19 19:36











                               Laboratory Results











WBC  7.8 k/uL (3.8-10.6)   08/03/19  06:57    


 


RBC  5.35 m/uL (4.30-5.90)   08/03/19  06:57    


 


Hgb  15.3 gm/dL (13.0-17.5)   08/03/19  06:57    


 


Hct  46.4 % (39.0-53.0)   08/03/19  06:57    


 


MCV  86.8 fL (80.0-100.0)   08/03/19  06:57    


 


MCH  28.6 pg (25.0-35.0)   08/03/19  06:57    


 


MCHC  33.0 g/dL (31.0-37.0)   08/03/19  06:57    


 


RDW  14.8 % (11.5-15.5)   08/03/19  06:57    


 


Plt Count  222 k/uL (150-450)   08/03/19  06:57    


 


Neutrophils %  60 %  08/03/19  06:57    


 


Lymphocytes %  26 %  08/03/19  06:57    


 


Monocytes %  8 %  08/03/19  06:57    


 


Eosinophils %  3 %  08/03/19  06:57    


 


Basophils %  1 %  08/03/19  06:57    


 


Neutrophils #  4.7 k/uL (1.3-7.7)   08/03/19  06:57    


 


Lymphocytes #  2.0 k/uL (1.0-4.8)   08/03/19  06:57    


 


Monocytes #  0.6 k/uL (0-1.0)   08/03/19  06:57    


 


Eosinophils #  0.2 k/uL (0-0.7)   08/03/19  06:57    


 


Basophils #  0.1 k/uL (0-0.2)   08/03/19  06:57    


 


Sodium  139 mmol/L (137-145)   08/03/19  06:57    


 


Potassium  4.6 mmol/L (3.5-5.1)   08/03/19  06:57    


 


Chloride  104 mmol/L ()   08/03/19  06:57    


 


Carbon Dioxide  27 mmol/L (22-30)   08/03/19  06:57    


 


Anion Gap  8 mmol/L  08/03/19  06:57    


 


BUN  16 mg/dL (9-20)   08/03/19  06:57    


 


Creatinine  0.93 mg/dL (0.66-1.25)   08/03/19  06:57    


 


Est GFR (CKD-EPI)AfAm  >90  (>60 ml/min/1.73 sqM)   08/03/19  06:57    


 


Est GFR (CKD-EPI)NonAf  >90  (>60 ml/min/1.73 sqM)   08/03/19  06:57    


 


Glucose  95 mg/dL (74-99)   08/03/19  06:57    


 


Estimated Ave Glu mg/dL  105   08/03/19  06:57    


 


Hemoglobin A1c  5.3 % (4.0-6.0)   08/03/19  06:57    


 


Calcium  9.5 mg/dL (8.4-10.2)   08/03/19  06:57    


 


Total Bilirubin  0.7 mg/dL (0.2-1.3)   08/03/19  06:57    


 


AST  68 U/L (17-59)  H  08/03/19  06:57    


 


ALT  124 U/L (21-72)  H  08/03/19  06:57    


 


Alkaline Phosphatase  88 U/L ()   08/03/19  06:57    


 


Total Protein  7.3 g/dL (6.3-8.2)   08/03/19  06:57    


 


Albumin  4.2 g/dL (3.5-5.0)   08/03/19  06:57    


 


Triglycerides  57 mg/dL (<150)   08/03/19  06:57    


 


Cholesterol  129 mg/dL (<200)   08/03/19  06:57    


 


LDL Cholesterol, Calc  72 mg/dL (0-99)   08/03/19  06:57    


 


HDL Cholesterol  46 mg/dL (40-60)   08/03/19  06:57    


 


TSH  0.522 mIU/L (0.465-4.680)   08/03/19  06:57    


 


Urine Color  Yellow   08/04/19  17:01    


 


Urine Appearance  Cloudy  (Clear)   08/04/19  17:01    


 


Urine pH  7.0  (5.0-8.0)   08/04/19  17:01    


 


Ur Specific Gravity  1.020  (1.001-1.035)   08/04/19  17:01    


 


Urine Protein  Negative  (Negative)   08/04/19  17:01    


 


Urine Glucose (UA)  Negative  (Negative)   08/04/19  17:01    


 


Urine Ketones  Negative  (Negative)   08/04/19  17:01    


 


Urine Blood  Negative  (Negative)   08/04/19  17:01    


 


Urine Nitrite  Negative  (Negative)   08/04/19  17:01    


 


Urine Bilirubin  Negative  (Negative)   08/04/19  17:01    


 


Urine Urobilinogen  <2.0 mg/dL (<2.0)   08/04/19  17:01    


 


Ur Leukocyte Esterase  Negative  (Negative)   08/04/19  17:01    


 


Urine Yeast (Budding)  Many /hpf (None)  H  08/04/19  17:01    


 


Urine Opiates Screen  Negative ng/mL (Negative)   08/04/19  17:01    


 


Ur Oxycodone Screen  Not Detected  (NotDetected)   08/02/19  15:45    


 


Urine Methadone Screen  Negative ng/mL (Negative)   08/04/19  17:01    


 


Ur Propoxyphene Screen  Negative ng/mL (Negative)   08/04/19  17:01    


 


Ur Barbiturates Screen  Not Detected  (NotDetected)   08/02/19  15:45    


 


Urine Barbiturates  Negative ng/mL (Negative)   08/04/19  17:01    


 


Valproic Acid  <10.0 ug/mL  08/08/19  09:21    


 


U Tricyclic Antidepress  Not Detected  (NotDetected)   08/02/19  15:45    


 


Ur Phencyclidine Scrn  Negative ng/mL (Negative)   08/04/19  17:01    


 


Ur Amphetamine Screen  Negative ng/mL (Negative)   08/04/19  17:01    


 


Ur Amphetamines Screen  Not Detected  (NotDetected)   08/02/19  15:45    


 


U Methamphetamines Scrn  Not Detected  (NotDetected)   08/02/19  15:45    


 


U Benzodiazepines Scrn  Negative ng/mL (Negative)   08/04/19  17:01    


 


Urine Cocaine Screen  Negative ng/mL (Negative)   08/04/19  17:01    


 


U Cannabinoids Screen  Positive ng/mL (Negative)  H  08/04/19  17:01    


 


U Marijuana (THC) Screen  Detected  (NotDetected)  H  08/02/19  15:45    


 


Urine Alcohol  Negative mg/dL (Negative)   08/04/19  17:01    











                                   Vital Signs











Temp  97.5 F L  08/09/19 06:54


 


Pulse  59 L  08/09/19 06:54


 


Resp  18   08/09/19 06:54


 


BP  92/54   08/09/19 06:54


 


Pulse Ox  97   08/02/19 19:35











Patient Condition at Discharge: Stable





Plan - Discharge Summary


Discharge Rx Participant: No


New Discharge Prescriptions: 


New


   lamoTRIgine [LaMICtal] 100 mg PO DAILY #28 tab


   Naproxen [Naprosyn] 250 mg PO BID PRN  tab


     PRN Reason: Pain


Discharge Medication List





Naproxen [Naprosyn] 250 mg PO BID PRN  tab 08/09/19 [Rx]


lamoTRIgine [LaMICtal] 100 mg PO DAILY #28 tab 08/09/19 [Rx]








Follow up Appointment(s)/Referral(s): 


None,Stated [Primary Care Provider] - 1-2 days


Discharge Disposition: HOME SELF-CARE

## 2019-09-25 ENCOUNTER — HOSPITAL ENCOUNTER (EMERGENCY)
Dept: HOSPITAL 47 - EC | Age: 34
Discharge: HOME | End: 2019-09-25
Payer: COMMERCIAL

## 2019-09-25 VITALS
SYSTOLIC BLOOD PRESSURE: 115 MMHG | HEART RATE: 75 BPM | DIASTOLIC BLOOD PRESSURE: 73 MMHG | RESPIRATION RATE: 16 BRPM | TEMPERATURE: 98.4 F

## 2019-09-25 DIAGNOSIS — F17.200: ICD-10-CM

## 2019-09-25 DIAGNOSIS — S05.01XA: Primary | ICD-10-CM
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
## 2019-09-25 PROCEDURE — 99283 EMERGENCY DEPT VISIT LOW MDM: CPT

## 2019-09-25 NOTE — ED
Eye Problem HPI





- General


Chief complaint: Eye Problems


Stated complaint: FB Rt eye


Time Seen by Provider: 09/25/19 13:45


Source: patient


Mode of arrival: ambulatory


Limitations: no limitations





- History of Present Illness


Initial comments: 





Patient is a 34-year-old male presenting to the emergency Department with 

complaints of right eye irritation 3 days.  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

thinks he might have gotten something in his eye.  Patient denies wearing 

contacts.  Patient states he's been trying to flush his eye out as well as uses 

finger to "scratch"his eye.  Patient states the redness has been increasing.  

Patient is having clear discharge from the eye.  Patient denies fever, chills.  

No other complaints at this time.  Upon arrival to ER, vital signs are stable.





- Related Data


                                  Previous Rx's











 Medication  Instructions  Recorded


 


Naproxen [Naprosyn] 250 mg PO BID PRN  tab 08/09/19


 


lamoTRIgine [LaMICtal] 100 mg PO DAILY #28 tab 08/09/19


 


Erythromycin Ophth Oint [Romycin 1 applic RIGHT EYE QID 5 Days #1 09/25/19





Ophth Oint] tube 











                                    Allergies











Allergy/AdvReac Type Severity Reaction Status Date / Time


 


No Known Allergies Allergy   Verified 09/25/19 12:39














Review of Systems


ROS Statement: 


Those systems with pertinent positive or pertinent negative responses have been 

documented in the HPI.





ROS Other: All systems not noted in ROS Statement are negative.





Past Medical History


Additional Past Medical History / Comment(s): Hepatitic C,


History of Any Multi-Drug Resistant Organisms: None Reported


Past Surgical History: No Surgical Hx Reported


Past Psychological History: Anxiety, Bipolar


Smoking Status: Current some day smoker


Past Alcohol Use History: None Reported


Past Drug Use History: None Reported, Marijuana





General Exam





- General Exam Comments


Initial Comments: 





GENERAL: 


Well-appearing, well-nourished and in no acute distress.





HEAD: 


Atraumatic, normocephalic.





EYES:


Pupils equal round and reactive to light, extraocular movements intact, right 

sclera injected, clear drainage,  conjunctiva are normal.  No foreign objects 

seen.  Under fluorescent light, 2 small abrasions on the lateral aspect of the 

right eye.





ENT: 


TMs normal, nares patent, oropharynx clear without exudates.  Moist mucous 

membranes.





NECK: 


Normal range of motion, supple without lymphadenopathy or JVD.





LUNGS:


 Breath sounds clear to auscultation bilaterally and equal.  No wheezes rales or

rhonchi.





HEART:


Regular rate and rhythm without murmurs, rubs or gallops.





ABDOMEN: 


Soft, nontender, normoactive bowel sounds.  No guarding, no rebound.  No masses 

appreciated.





: Deferred 





EXTREMITIES: 


Normal range of motion, no pitting or edema.  No clubbing or cyanosis.





NEUROLOGICAL: 


Cranial nerves II through XII grossly intact.  Normal speech, normal gait.





PSYCH:


Normal mood, normal affect.





SKIN:


 Warm, Dry, normal turgor, no rashes or lesions noted.


Limitations: no limitations





Course


                                   Vital Signs











  09/25/19





  12:35


 


Temperature 98.4 F


 


Pulse Rate 75


 


Respiratory 16





Rate 


 


Blood Pressure 115/73


 


O2 Sat by Pulse 98





Oximetry 














Medical Decision Making





- Medical Decision Making





Patient is a 34-year-old male presenting with right eye irritation 3 days.  

Patient states he may have a foreign object in his eye.  On exam there is no 

foreign body seen.  Under fluorescent light there is 2 small abrasions to the 

right side, lateral aspect.  No ulcers seen.  Patient will be started on 

erythromycin ointment to use 4 times daily.  Patient will follow up with eye 

doctor if symptoms persist.  Return parameters were discussed with the patient 

he verbalizes understanding.  Patient is stable for discharge at this time.





Disposition


Clinical Impression: 


 Right corneal abrasion





Disposition: HOME SELF-CARE


Condition: Stable


Instructions (If sedation given, give patient instructions):  Corneal Abrasion 

(ED)


Additional Instructions: 


Please return to the Emergency Department if symptoms worsen or any other 

concerns.


Use eye antibiotic ointment as discussed.


Follow-up with ophthalmology if symptoms persist.


Prescriptions: 


Erythromycin Ophth Oint [Romycin Ophth Oint] 1 applic RIGHT EYE QID 5 Days #1 

tube


Is patient prescribed a controlled substance at d/c from ED?: No


Referrals: 


None,Stated [Primary Care Provider] - 1-2 days

## 2019-10-14 ENCOUNTER — HOSPITAL ENCOUNTER (EMERGENCY)
Dept: HOSPITAL 47 - EC | Age: 34
Discharge: HOME | End: 2019-10-14
Payer: COMMERCIAL

## 2019-10-14 VITALS
DIASTOLIC BLOOD PRESSURE: 95 MMHG | TEMPERATURE: 97.4 F | HEART RATE: 71 BPM | SYSTOLIC BLOOD PRESSURE: 133 MMHG | RESPIRATION RATE: 20 BRPM

## 2019-10-14 DIAGNOSIS — F17.200: ICD-10-CM

## 2019-10-14 DIAGNOSIS — G43.909: Primary | ICD-10-CM

## 2019-10-14 DIAGNOSIS — J06.9: ICD-10-CM

## 2019-10-14 DIAGNOSIS — M54.16: ICD-10-CM

## 2019-10-14 PROCEDURE — 99284 EMERGENCY DEPT VISIT MOD MDM: CPT

## 2019-10-14 PROCEDURE — 71046 X-RAY EXAM CHEST 2 VIEWS: CPT

## 2019-10-14 PROCEDURE — 96361 HYDRATE IV INFUSION ADD-ON: CPT

## 2019-10-14 PROCEDURE — 96375 TX/PRO/DX INJ NEW DRUG ADDON: CPT

## 2019-10-14 PROCEDURE — 96374 THER/PROPH/DIAG INJ IV PUSH: CPT

## 2019-10-14 NOTE — XR
EXAM:

  XR Chest, 2 Views

 

CLINICAL HISTORY:

  Cough.

 

TECHNIQUE:

  Frontal and lateral views of the chest.

 

COMPARISON:

  9/7/2017.

 

FINDINGS:

  Lungs:  The lungs are well aerated.  No consolidative change.

  Pleural space:  No pleural effusion.  No pneumothorax.

  Heart:  Cardiomediastinal silhouettes unremarkable.

  Mediastinum:  See above.

  Bones/joints:  Osseous structures are unremarkable.

 

IMPRESSION:     

  No active disease.

## 2019-10-14 NOTE — ED
General Adult HPI





- General


Source: patient, RN notes reviewed


Mode of arrival: ambulatory


Limitations: no limitations





<Florentino Nino - Last Filed: 10/14/19 07:06>





<Shital Allen - Last Filed: 10/14/19 07:32>





- General


Chief complaint: Headache


Stated complaint: Headache; visual disturbance


Time Seen by Provider: 10/14/19 06:02





- History of Present Illness


Initial comments: 





This a 34-year-old male presents emergency Department with multiple complaints 

primarily complains of a headache.  Patient states that his headache has been 

present for the last 2 days.  Patient states that it is a diffuse headache and 

which she has had a headache like this in the past.  Patient states that onset. 

Patient is medically has some photophobia with no blurred vision or double 

vision.  Patient denies any recent fevers, chills, neck pain or neck stiffness. 

Patient is not taking any for his headache at this time.  Patient does admit 

that she had a CAT scan a past for headaches.  Patient also complains that he's 

had a cough, nonproductive which has been worsening over the last 1 week.  

Patient does admit that he is a daily smoker.  Patient denies any nasal 

congestion, sore throat.  Patient states he does not want abdominal pain 

including nausea vomiting diarrhea constipation.  Patient also complains of some

low back pain which is chronic in nature forearm denies any acute injuries 

denies any bowel bladder incontinence or retention. (Florentino Nino)





- Related Data


                                  Previous Rx's











 Medication  Instructions  Recorded


 


Naproxen [Naprosyn] 250 mg PO BID PRN  tab 08/09/19


 


lamoTRIgine [LaMICtal] 100 mg PO DAILY #28 tab 08/09/19


 


Erythromycin Ophth Oint [Romycin 1 applic RIGHT EYE QID 5 Days #1 09/25/19





Ophth Oint] tube 


 


Ibuprofen [Motrin] 600 mg PO Q8HR PRN #30 tab 10/14/19











                                    Allergies











Allergy/AdvReac Type Severity Reaction Status Date / Time


 


No Known Allergies Allergy   Verified 10/14/19 05:58














Review of Systems


ROS Other: All systems not noted in ROS Statement are negative.





<Florentino Nino - Last Filed: 10/14/19 07:06>


ROS Other: All systems not noted in ROS Statement are negative.





<Shital Allen - Last Filed: 10/14/19 07:32>


ROS Statement: 


Those systems with pertinent positive or pertinent negative responses have been 

documented in the HPI.








Past Medical History


Additional Past Medical History / Comment(s): Hepatitic C,


History of Any Multi-Drug Resistant Organisms: None Reported


Past Surgical History: No Surgical Hx Reported


Past Psychological History: Anxiety, Bipolar


Smoking Status: Current every day smoker


Past Alcohol Use History: None Reported


Past Drug Use History: Marijuana





<Florentino Nino - Last Filed: 10/14/19 07:06>





General Exam


Limitations: no limitations


General appearance: alert, in no apparent distress


Head exam: Present: atraumatic, normocephalic, normal inspection


Eye exam: Present: normal appearance, PERRL, EOMI.  Absent: scleral icterus, 

conjunctival injection, periorbital swelling


ENT exam: Present: normal exam, normal oropharynx, mucous membranes moist, TM's 

normal bilaterally, normal external ear exam


Neck exam: Present: normal inspection, full ROM.  Absent: tenderness, 

meningismus, lymphadenopathy


Respiratory exam: Present: normal lung sounds bilaterally.  Absent: respiratory 

distress, wheezes, rales, rhonchi, stridor


Cardiovascular Exam: Present: regular rate, normal rhythm, normal heart sounds. 

 Absent: systolic murmur, diastolic murmur, rubs, gallop, clicks


GI/Abdominal exam: Present: soft, normal bowel sounds.  Absent: distended, 

tenderness, guarding, rebound, rigid


Back exam: Present: full ROM, tenderness (Minimal left lumbar), paraspinal ten

derness.  Absent: CVA tenderness (R), CVA tenderness (L), vertebral tenderness


Neurological exam: Present: alert, oriented X3, CN II-XII intact, reflexes 

normal, other (Finger to nose intact bilaterally).  Absent: motor sensory 

deficit


Skin exam: Present: warm, dry, intact, normal color.  Absent: rash





<Florentino Nino - Last Filed: 10/14/19 07:06>





Course





<Florentino Nino - Last Filed: 10/14/19 07:06>





                                   Vital Signs











  10/14/19





  05:55


 


Temperature 97.4 F L


 


Pulse Rate 71


 


Respiratory 20





Rate 


 


Blood Pressure 133/95


 


O2 Sat by Pulse 99





Oximetry 














- Reevaluation(s)


Reevaluation #1: 





10/14/19 07:06


Patient reevaluated, patient resting comfortably in no distress patient states 

his headache has resolved.  Updated on chest x-ray results. (Florentino Nino)





Medical Decision Making





<Florentino Nino - Last Filed: 10/14/19 07:06>





<Shital Allen - Last Filed: 10/14/19 07:32>





- Medical Decision Making





Physical x-ray obtained which shows no acute abnormality.  Patient's improved 

headache after migraine cocktail.  Normal neuro exam.  Patient will be 

discharged at this time stable condition return parameters were discussed. 

(Florentino Nino)


I was available for consultation in the emergency room.  History and physical 

exam were done by the mid-level provider.  I was consulted for the patient's c

are.  I reviewed the case with the mid-level provider and based on their 

presentation of the patient, I agree with the assessment, medical decision-

making and plan of care as documented.





Chart was dictated using Dragon dictation software.  Attempts were made to 

correct any dictation errors however some typographical errors may persist 

(Shital Allen)





Disposition


Is patient prescribed a controlled substance at d/c from ED?: No


Time of Disposition: 07:07





<Florentino Nino - Last Filed: 10/14/19 07:06>





<Shital Allen - Last Filed: 10/14/19 07:32>


Clinical Impression: 


 Migraine, URI (upper respiratory infection), Lumbar radiculopathy, acute





Disposition: HOME SELF-CARE


Condition: Stable


Instructions (If sedation given, give patient instructions):  Acute Headache 

(ED)


Additional Instructions: 


Please return to the Emergency Department if symptoms worsen or any other 

concerns.


Prescriptions: 


Ibuprofen [Motrin] 600 mg PO Q8HR PRN #30 tab


 PRN Reason: Pain


Referrals: 


None,Stated [Primary Care Provider] - 1-2 days

## 2020-06-12 ENCOUNTER — HOSPITAL ENCOUNTER (EMERGENCY)
Dept: HOSPITAL 47 - EC | Age: 35
Discharge: HOME | End: 2020-06-12
Payer: COMMERCIAL

## 2020-06-12 VITALS — TEMPERATURE: 98 F | DIASTOLIC BLOOD PRESSURE: 72 MMHG | SYSTOLIC BLOOD PRESSURE: 123 MMHG | HEART RATE: 69 BPM

## 2020-06-12 VITALS — RESPIRATION RATE: 18 BRPM

## 2020-06-12 DIAGNOSIS — F17.200: ICD-10-CM

## 2020-06-12 DIAGNOSIS — L03.317: Primary | ICD-10-CM

## 2020-06-12 DIAGNOSIS — Z86.14: ICD-10-CM

## 2020-06-12 DIAGNOSIS — Z88.8: ICD-10-CM

## 2020-06-12 LAB
ALBUMIN SERPL-MCNC: 3.9 G/DL (ref 3.5–5)
ALP SERPL-CCNC: 99 U/L (ref 38–126)
ALT SERPL-CCNC: 32 U/L (ref 4–49)
ANION GAP SERPL CALC-SCNC: 10 MMOL/L
AST SERPL-CCNC: 32 U/L (ref 17–59)
BASOPHILS # BLD AUTO: 0 K/UL (ref 0–0.2)
BASOPHILS NFR BLD AUTO: 0 %
BUN SERPL-SCNC: 11 MG/DL (ref 9–20)
CALCIUM SPEC-MCNC: 8.8 MG/DL (ref 8.4–10.2)
CHLORIDE SERPL-SCNC: 104 MMOL/L (ref 98–107)
CO2 SERPL-SCNC: 26 MMOL/L (ref 22–30)
EOSINOPHIL # BLD AUTO: 0.2 K/UL (ref 0–0.7)
EOSINOPHIL NFR BLD AUTO: 2 %
ERYTHROCYTE [DISTWIDTH] IN BLOOD BY AUTOMATED COUNT: 4.75 M/UL (ref 4.3–5.9)
ERYTHROCYTE [DISTWIDTH] IN BLOOD: 13 % (ref 11.5–15.5)
GLUCOSE SERPL-MCNC: 100 MG/DL (ref 74–99)
HCT VFR BLD AUTO: 40.3 % (ref 39–53)
HGB BLD-MCNC: 13.9 GM/DL (ref 13–17.5)
LYMPHOCYTES # SPEC AUTO: 1.7 K/UL (ref 1–4.8)
LYMPHOCYTES NFR SPEC AUTO: 17 %
MCH RBC QN AUTO: 29.3 PG (ref 25–35)
MCHC RBC AUTO-ENTMCNC: 34.5 G/DL (ref 31–37)
MCV RBC AUTO: 84.9 FL (ref 80–100)
MONOCYTES # BLD AUTO: 0.8 K/UL (ref 0–1)
MONOCYTES NFR BLD AUTO: 8 %
NEUTROPHILS # BLD AUTO: 7.2 K/UL (ref 1.3–7.7)
NEUTROPHILS NFR BLD AUTO: 71 %
PLATELET # BLD AUTO: 227 K/UL (ref 150–450)
POTASSIUM SERPL-SCNC: 4.2 MMOL/L (ref 3.5–5.1)
PROT SERPL-MCNC: 7 G/DL (ref 6.3–8.2)
SODIUM SERPL-SCNC: 140 MMOL/L (ref 137–145)
WBC # BLD AUTO: 10 K/UL (ref 3.8–10.6)

## 2020-06-12 PROCEDURE — 96375 TX/PRO/DX INJ NEW DRUG ADDON: CPT

## 2020-06-12 PROCEDURE — 83605 ASSAY OF LACTIC ACID: CPT

## 2020-06-12 PROCEDURE — 36415 COLL VENOUS BLD VENIPUNCTURE: CPT

## 2020-06-12 PROCEDURE — 80053 COMPREHEN METABOLIC PANEL: CPT

## 2020-06-12 PROCEDURE — 99283 EMERGENCY DEPT VISIT LOW MDM: CPT

## 2020-06-12 PROCEDURE — 87040 BLOOD CULTURE FOR BACTERIA: CPT

## 2020-06-12 PROCEDURE — 96374 THER/PROPH/DIAG INJ IV PUSH: CPT

## 2020-06-12 PROCEDURE — 85025 COMPLETE CBC W/AUTO DIFF WBC: CPT

## 2020-06-12 PROCEDURE — 72193 CT PELVIS W/DYE: CPT

## 2020-06-12 RX ADMIN — LIDOCAINE HYDROCHLORIDE ONE ML: 10 INJECTION, SOLUTION INFILTRATION; PERINEURAL at 13:12

## 2020-06-12 RX ADMIN — LIDOCAINE HYDROCHLORIDE ONE: 10 INJECTION, SOLUTION INFILTRATION; PERINEURAL at 14:52

## 2020-06-12 NOTE — ED
General Adult HPI





- General


Chief complaint: Skin/Abscess/Foreign Body


Stated complaint: redness on buttocks


Time Seen by Provider: 06/12/20 12:37


Source: patient, police, RN notes reviewed


Mode of arrival: ambulatory


Limitations: no limitations





- History of Present Illness


Initial comments: 





34-year-old male presents to the emergency department for chief complaint of 

abscess to the right buttocks.  Patient states this started 4 days ago.  Patient

states he has been on doxycycline for 2 days and had incision and drainage 2 

days ago.  Unsure if puss was expressed from the wound.  Patient denies fevers 

or chills.  Patient has a history of MRSA and is currently incarcerated.  

Patient denies any abdominal pain.Patient has no other complaints at this time 

including shortness of breath, chest pain, abdominal pain, nausea or vomiting, 

headache, or visual changes.





- Related Data


                                  Previous Rx's











 Medication  Instructions  Recorded


 


Naproxen [Naprosyn] 250 mg PO BID PRN  tab 08/09/19


 


lamoTRIgine [LaMICtal] 100 mg PO DAILY #28 tab 08/09/19


 


Erythromycin Ophth Oint [Romycin 1 applic RIGHT EYE QID 5 Days #1 09/25/19





Ophth Oint] tube 


 


Ibuprofen [Motrin] 600 mg PO Q8HR PRN #30 tab 10/14/19


 


Clindamycin [Cleocin] 450 mg PO Q6H 10 Days #120 cap 06/12/20











                                    Allergies











Allergy/AdvReac Type Severity Reaction Status Date / Time


 


lamotrigine [From Lamictal] Allergy  Rash/Hives Verified 06/12/20 12:29














Review of Systems


ROS Statement: 


Those systems with pertinent positive or pertinent negative responses have been 

documented in the HPI.





ROS Other: All systems not noted in ROS Statement are negative.





Past Medical History


Past Medical History: No Reported History


Additional Past Medical History / Comment(s): Hepatitic C,


History of Any Multi-Drug Resistant Organisms: None Reported


Past Surgical History: No Surgical Hx Reported


Past Psychological History: Anxiety, Bipolar, Depression


Smoking Status: Current every day smoker


Past Alcohol Use History: None Reported


Past Drug Use History: Cocaine, Heroin, Marijuana





General Exam


Limitations: no limitations


General appearance: alert, in no apparent distress


Head exam: Present: atraumatic, normocephalic, normal inspection


Eye exam: Present: normal appearance, PERRL, EOMI.  Absent: scleral icterus, 

conjunctival injection, periorbital swelling


ENT exam: Present: normal exam, mucous membranes moist


Neck exam: Present: normal inspection, full ROM.  Absent: tenderness, meni

ngismus, lymphadenopathy


Respiratory exam: Present: normal lung sounds bilaterally.  Absent: respiratory 

distress, wheezes, rales, rhonchi, stridor


Cardiovascular Exam: Present: regular rate, normal rhythm, normal heart sounds. 

Absent: bradycardia, tachycardia, irregular rhythm


GI/Abdominal exam: Present: soft, normal bowel sounds.  Absent: distended, 

tenderness, guarding, rebound, rigid


Back exam: Present: other (Buttock exam did reveal 10 x 10 cm of erythema with 

induration present of the right gluteus area)





Course


                                   Vital Signs











  06/12/20





  12:25


 


Temperature 98.0 F


 


Pulse Rate 77


 


Respiratory 18





Rate 


 


Blood Pressure 122/82


 


O2 Sat by Pulse 100





Oximetry 














Medical Decision Making





- Medical Decision Making





Vitals are stable.  HPI and physical exam as I came in.  A 10 cm x 10 cm area of

indurated erythema over the right gluteal.  CBC CMP unremarkable.  CT pelvis 

showed diffuse soft tissue edema overlying left gluteal region.  No definable 

abscess cavity, correlate for cellulitis.  I do not see an area for incision on 

physical exam either so this is clinically correlated.  Patient will be changed 

to clindamycin instead of doxycycline.  Recommend monitoring the area and retur

pop here if it is not improving in the next 24-48 hours. I discussed this case 

with attending Dr. Coleman who agrees with this assessment and treatment plan.





- Lab Data


Result diagrams: 


                                 06/12/20 13:05





                                 06/12/20 13:05


                                   Lab Results











  06/12/20 06/12/20 06/12/20 Range/Units





  13:05 13:05 13:05 


 


WBC  10.0    (3.8-10.6)  k/uL


 


RBC  4.75    (4.30-5.90)  m/uL


 


Hgb  13.9    (13.0-17.5)  gm/dL


 


Hct  40.3    (39.0-53.0)  %


 


MCV  84.9    (80.0-100.0)  fL


 


MCH  29.3    (25.0-35.0)  pg


 


MCHC  34.5    (31.0-37.0)  g/dL


 


RDW  13.0    (11.5-15.5)  %


 


Plt Count  227    (150-450)  k/uL


 


Neutrophils %  71    %


 


Lymphocytes %  17    %


 


Monocytes %  8    %


 


Eosinophils %  2    %


 


Basophils %  0    %


 


Neutrophils #  7.2    (1.3-7.7)  k/uL


 


Lymphocytes #  1.7    (1.0-4.8)  k/uL


 


Monocytes #  0.8    (0-1.0)  k/uL


 


Eosinophils #  0.2    (0-0.7)  k/uL


 


Basophils #  0.0    (0-0.2)  k/uL


 


Sodium   140   (137-145)  mmol/L


 


Potassium   4.2   (3.5-5.1)  mmol/L


 


Chloride   104   ()  mmol/L


 


Carbon Dioxide   26   (22-30)  mmol/L


 


Anion Gap   10   mmol/L


 


BUN   11   (9-20)  mg/dL


 


Creatinine   0.81   (0.66-1.25)  mg/dL


 


Est GFR (CKD-EPI)AfAm   >90   (>60 ml/min/1.73 sqM)  


 


Est GFR (CKD-EPI)NonAf   >90   (>60 ml/min/1.73 sqM)  


 


Glucose   100 H   (74-99)  mg/dL


 


Plasma Lactic Acid Vipul    1.1  (0.7-2.0)  mmol/L


 


Calcium   8.8   (8.4-10.2)  mg/dL


 


Total Bilirubin   0.5   (0.2-1.3)  mg/dL


 


AST   32   (17-59)  U/L


 


ALT   32   (4-49)  U/L


 


Alkaline Phosphatase   99   ()  U/L


 


Total Protein   7.0   (6.3-8.2)  g/dL


 


Albumin   3.9   (3.5-5.0)  g/dL














Disposition


Clinical Impression: 


 Cellulitis





Disposition: HOME SELF-CARE


Condition: Good


Additional Instructions: 


Please take clindamycin instead of doxycycline.  Please monitor area of redness 

and if it is spreading or not improving in the next 48 hours return to the 

emergency department.  Return for any other worsening symptoms such as fevers.


Prescriptions: 


Clindamycin [Cleocin] 450 mg PO Q6H 10 Days #120 cap


Is patient prescribed a controlled substance at d/c from ED?: No


Referrals: 


Nalini Costa MD [REFERRING] - 1-2 days


Time of Disposition: 14:42

## 2020-06-12 NOTE — CT
EXAMINATION TYPE: CT pelvis w con

 

DATE OF EXAM: 6/12/2020

 

COMPARISON: 9/18/2018

 

HISTORY: Abscess right mid buttocks

 

CT DLP: 779 mGycm

Automated exposure control for dose reduction was used.

 

CONTRAST: 

CT scan of the pelvis is performed with IV Contrast, patient injected with 100 mL of Isovue 300.

 

FINDINGS- 

Visualized portions of the spleen, liver, kidneys and aorta have a normal appearance. Bowel gas patte
rn as visualized nonspecific. Osseous structures intact.

 

There is diffuse skin thickening and soft tissue edema involving the posterior subcutaneous gluteal r
egions correlate for cellulitis. No definite abscess identified. More localized attenuation in the teague
bcutaneous tissues may represent a small phlegmon.

 

IMPRESSION- 

1. Diffuse soft tissue edema overlying the left gluteal region. No definable abscess cavity, correlat
e for cellulitis.

## 2020-07-05 ENCOUNTER — HOSPITAL ENCOUNTER (EMERGENCY)
Dept: HOSPITAL 47 - EC | Age: 35
Discharge: HOME | End: 2020-07-05
Payer: COMMERCIAL

## 2020-07-05 VITALS
TEMPERATURE: 98.3 F | DIASTOLIC BLOOD PRESSURE: 106 MMHG | SYSTOLIC BLOOD PRESSURE: 145 MMHG | HEART RATE: 115 BPM | RESPIRATION RATE: 18 BRPM

## 2020-07-05 DIAGNOSIS — R45.851: ICD-10-CM

## 2020-07-05 DIAGNOSIS — F17.200: ICD-10-CM

## 2020-07-05 DIAGNOSIS — Z88.8: ICD-10-CM

## 2020-07-05 DIAGNOSIS — F14.20: Primary | ICD-10-CM

## 2020-07-05 DIAGNOSIS — Z91.5: ICD-10-CM

## 2020-07-05 PROCEDURE — 99285 EMERGENCY DEPT VISIT HI MDM: CPT

## 2020-07-05 NOTE — ED
General Adult HPI





- General


Source: patient, police, RN notes reviewed, old records reviewed


Mode of arrival: ambulatory


Limitations: no limitations





<Virgilio Gotti - Last Filed: 07/05/20 14:59>





<Virgilio Romo - Last Filed: 07/13/20 14:16>





- General


Chief complaint: Psychiatric Symptoms


Stated complaint: mental health


Time Seen by Provider: 07/05/20 11:35





- History of Present Illness


Initial comments: 





This is a 34-year-old male who states he is addicted to crack cocaine.  Patient 

states she did crack last this morning.  Patient states he is feeling suicidal 

and thinks he is going to potentially use heroin to kill himself.  Patient 

states he wants to get off the crack but he is so addicted that is been unable 

to on his own.  Patient states every time he uses feel so ashamed and he is so 

depressed that he always wants to kill himself.  Patient states she made an 

attempt to kill himself last year with fentanyl.  Patient states.  Process now 

is that he might kill himself with heroin.  Patient denies any physical 

complaints today.  Patient denies any headache patient denies numbness weakness.

 Patient denies chest pain difficulty breathing shortness breath.  Patient 

denies any fever chills or cough per patient denies any abdominal pain. 

(Virgilio Gotti)





- Related Data


                                  Previous Rx's











 Medication  Instructions  Recorded


 


Naproxen [Naprosyn] 250 mg PO BID PRN  tab 08/09/19


 


lamoTRIgine [LaMICtal] 100 mg PO DAILY #28 tab 08/09/19


 


Erythromycin Ophth Oint [Romycin 1 applic RIGHT EYE QID 5 Days #1 09/25/19





Ophth Oint] tube 


 


Ibuprofen [Motrin] 600 mg PO Q8HR PRN #30 tab 10/14/19


 


Clindamycin [Cleocin] 450 mg PO Q6H 10 Days #120 cap 06/12/20











                                    Allergies











Allergy/AdvReac Type Severity Reaction Status Date / Time


 


lamotrigine [From Lamictal] Allergy  Rash/Hives Verified 07/05/20 11:40














Review of Systems


ROS Other: All systems not noted in ROS Statement are negative.





<Virgilio Gotti - Last Filed: 07/05/20 14:59>


ROS Other: All systems not noted in ROS Statement are negative.





<Virgilio Romo - Last Filed: 07/13/20 14:16>


ROS Statement: 


Those systems with pertinent positive or pertinent negative responses have been 

documented in the HPI.








Past Medical History


Past Medical History: No Reported History


Additional Past Medical History / Comment(s): Hepatitic C,


History of Any Multi-Drug Resistant Organisms: None Reported


Past Surgical History: No Surgical Hx Reported


Past Psychological History: Anxiety, Bipolar, Depression


Smoking Status: Current every day smoker


Past Alcohol Use History: None Reported


Past Drug Use History: Cocaine, Heroin, Marijuana





<Virgilio Gotti - Last Filed: 07/05/20 14:59>





General Exam


Limitations: no limitations





<Virgilio Gotti - Last Filed: 07/05/20 14:59>





- General Exam Comments


Initial Comments: 





GENERAL:


Patient is well-developed and well-nourished.  Patient is nontoxic and well-

hydrated and is in mild distress.





ENT:


Neck is soft and supple.  No significant lymphadenopathy is noted.  Oropharynx 

is clear.  Moist mucous membranes.  Neck has full range of motion without 

eliciting any pain. 





EYES:


The sclera were anicteric and conjunctiva were pink and moist.  Extraocular 

movements were intact and pupils were equal round and reactive to light.  

Eyelids were unremarkable.





PULMONARY:


Unlabored respirations.  Good breath sounds bilaterally.  No audible rales rh

onchi or wheezing was noted.





CARDIOVASCULAR:


Patient is tachycardic at about 110 beats a minute





ABDOMEN:


Soft and nontender with normal bowel sounds.  





SKIN:


Skin is clear with no lesions or rashes and otherwise unremarkable.





NEUROLOGIC:


Patient is alert and oriented x3.  Cranial nerves II through XII are grossly 

intact.  Motor and sensory are also intact.  Normal speech, volume and content. 

Symmetrical smile.  





MUSCULOSKELETAL:


Normal extremities with adequate strength and full range of motion.  No lower 

extremity swelling or edema.  No calf tenderness.





LYMPHATICS:


No significant lymphadenopathy is noted





PSYCHIATRIC:


Patient states he wants to kill himself by using heroin. (Virgilio Gotti)





Course





                                   Vital Signs











  07/05/20 07/05/20





  11:36 16:21


 


Temperature 98.3 F 98.3 F


 


Pulse Rate 115 H 115 H


 


Respiratory 18 18





Rate  


 


Blood Pressure 145/106 145/106


 


O2 Sat by Pulse 99 99





Oximetry  














Medical Decision Making





<Virgilio Gotti - Last Filed: 07/05/20 14:59>





- Medical Decision Making





Dr. Romo would take over the care of this patient at 3 PM (Virgilio Gotti)





Disposition





<Virgilio Gotti - Last Filed: 07/05/20 14:59>


Is patient prescribed a controlled substance at d/c from ED?: No





<Virgilio Romo - Last Filed: 07/13/20 14:16>


Clinical Impression: 


 Suicidal thoughts, Drug addiction





Disposition: HOME SELF-CARE


Condition: Good


Instructions (If sedation given, give patient instructions):  Suicide Prevention

(ED), Narcotic Use Disorder (ED)


Referrals: 


None,Stated [Primary Care Provider] - 1-2 days

## 2023-06-02 ENCOUNTER — HOSPITAL ENCOUNTER (EMERGENCY)
Dept: HOSPITAL 47 - EC | Age: 38
Discharge: HOME | End: 2023-06-02
Payer: COMMERCIAL

## 2023-06-02 VITALS
SYSTOLIC BLOOD PRESSURE: 128 MMHG | RESPIRATION RATE: 19 BRPM | DIASTOLIC BLOOD PRESSURE: 95 MMHG | HEART RATE: 65 BPM | TEMPERATURE: 98 F

## 2023-06-02 DIAGNOSIS — J98.01: ICD-10-CM

## 2023-06-02 DIAGNOSIS — R07.89: Primary | ICD-10-CM

## 2023-06-02 DIAGNOSIS — Z88.8: ICD-10-CM

## 2023-06-02 DIAGNOSIS — F12.90: ICD-10-CM

## 2023-06-02 DIAGNOSIS — Z20.822: ICD-10-CM

## 2023-06-02 DIAGNOSIS — F17.200: ICD-10-CM

## 2023-06-02 DIAGNOSIS — Z86.59: ICD-10-CM

## 2023-06-02 LAB
ALBUMIN SERPL-MCNC: 4.6 G/DL (ref 3.5–5)
ALP SERPL-CCNC: 109 U/L (ref 38–126)
ALT SERPL-CCNC: 30 U/L (ref 4–49)
ANION GAP SERPL CALC-SCNC: 9 MMOL/L
APTT BLD: 26.2 SEC (ref 22–30)
AST SERPL-CCNC: 41 U/L (ref 17–59)
BASOPHILS # BLD AUTO: 0.1 K/UL (ref 0–0.2)
BASOPHILS NFR BLD AUTO: 1 %
BUN SERPL-SCNC: 8 MG/DL (ref 9–20)
CALCIUM SPEC-MCNC: 9.3 MG/DL (ref 8.4–10.2)
CHLORIDE SERPL-SCNC: 105 MMOL/L (ref 98–107)
CO2 SERPL-SCNC: 24 MMOL/L (ref 22–30)
EOSINOPHIL # BLD AUTO: 0.1 K/UL (ref 0–0.7)
EOSINOPHIL NFR BLD AUTO: 1 %
ERYTHROCYTE [DISTWIDTH] IN BLOOD BY AUTOMATED COUNT: 5.73 M/UL (ref 4.3–5.9)
ERYTHROCYTE [DISTWIDTH] IN BLOOD: 13.1 % (ref 11.5–15.5)
GLUCOSE SERPL-MCNC: 88 MG/DL (ref 74–99)
HCT VFR BLD AUTO: 48 % (ref 39–53)
HGB BLD-MCNC: 16 GM/DL (ref 13–17.5)
INR PPP: 1 (ref ?–1.2)
LYMPHOCYTES # SPEC AUTO: 1.5 K/UL (ref 1–4.8)
LYMPHOCYTES NFR SPEC AUTO: 15 %
MAGNESIUM SPEC-SCNC: 2 MG/DL (ref 1.6–2.3)
MCH RBC QN AUTO: 27.9 PG (ref 25–35)
MCHC RBC AUTO-ENTMCNC: 33.3 G/DL (ref 31–37)
MCV RBC AUTO: 83.8 FL (ref 80–100)
MONOCYTES # BLD AUTO: 0.7 K/UL (ref 0–1)
MONOCYTES NFR BLD AUTO: 6 %
NEUTROPHILS # BLD AUTO: 7.8 K/UL (ref 1.3–7.7)
NEUTROPHILS NFR BLD AUTO: 76 %
PLATELET # BLD AUTO: 255 K/UL (ref 150–450)
POTASSIUM SERPL-SCNC: 4.4 MMOL/L (ref 3.5–5.1)
PROT SERPL-MCNC: 7.8 G/DL (ref 6.3–8.2)
PT BLD: 10.4 SEC (ref 9–12)
SODIUM SERPL-SCNC: 138 MMOL/L (ref 137–145)
WBC # BLD AUTO: 10.3 K/UL (ref 3.8–10.6)

## 2023-06-02 PROCEDURE — 83735 ASSAY OF MAGNESIUM: CPT

## 2023-06-02 PROCEDURE — 83880 ASSAY OF NATRIURETIC PEPTIDE: CPT

## 2023-06-02 PROCEDURE — 87636 SARSCOV2 & INF A&B AMP PRB: CPT

## 2023-06-02 PROCEDURE — 96361 HYDRATE IV INFUSION ADD-ON: CPT

## 2023-06-02 PROCEDURE — 36415 COLL VENOUS BLD VENIPUNCTURE: CPT

## 2023-06-02 PROCEDURE — 85610 PROTHROMBIN TIME: CPT

## 2023-06-02 PROCEDURE — 93005 ELECTROCARDIOGRAM TRACING: CPT

## 2023-06-02 PROCEDURE — 85379 FIBRIN DEGRADATION QUANT: CPT

## 2023-06-02 PROCEDURE — 80053 COMPREHEN METABOLIC PANEL: CPT

## 2023-06-02 PROCEDURE — 96374 THER/PROPH/DIAG INJ IV PUSH: CPT

## 2023-06-02 PROCEDURE — 85730 THROMBOPLASTIN TIME PARTIAL: CPT

## 2023-06-02 PROCEDURE — 99285 EMERGENCY DEPT VISIT HI MDM: CPT

## 2023-06-02 PROCEDURE — 85025 COMPLETE CBC W/AUTO DIFF WBC: CPT

## 2023-06-02 PROCEDURE — 94640 AIRWAY INHALATION TREATMENT: CPT

## 2023-06-02 PROCEDURE — 84484 ASSAY OF TROPONIN QUANT: CPT

## 2023-06-02 PROCEDURE — 71046 X-RAY EXAM CHEST 2 VIEWS: CPT

## 2023-06-02 NOTE — ED
General Adult HPI





- General


Chief complaint: Chest Pain


Stated complaint: chest pain,SOB


Time Seen by Provider: 06/02/23 07:30


Source: patient, RN notes reviewed, old records reviewed


Mode of arrival: ambulatory


Limitations: no limitations





- History of Present Illness


Initial comments: 





Patient is a 37-year-old male who presents emergency Department complaining of 

right-sided chest pain.  Began 2 days ago.  Please it may be associated with the

abnormal but he has had over the last few days.  Describes the pain as a sharp 

sensation located over the anterior right aspect of his chest.  Worse with 

movement of his right arm as well as twisting his thorax.  Did have some 

episodes of posttussive emesis earlier as well.  Denies any nausea or vomiting. 

Denies any diaphoresis episodes.  Denies any abdominal pain.  Denies any left-

sided chest pain.  Denies any fevers or known sick contacts.  Patient does vape 

as well as smoke marijuana.  He is a remote polysubstance abuser, last used in 

2017.  He presents as the pain is concerning and wants to make sure that it was 

not his heart or lungs.  Denies any history of cardiac disease.  Denies any 

history of COPD or asthma.  Denies any history of blood clots.  Denies any lower

extremity swelling.  Presents for further evaluation at this time.





- Related Data


                                  Previous Rx's











 Medication  Instructions  Recorded


 


Naproxen [Naprosyn] 250 mg PO BID PRN  tab 08/09/19


 


lamoTRIgine [LaMICtal] 100 mg PO DAILY #28 tab 08/09/19


 


Erythromycin Ophth Oint [Romycin 1 applic RIGHT EYE QID 5 Days #1 09/25/19





Ophth Oint] tube 


 


Ibuprofen [Motrin] 600 mg PO Q8HR PRN #30 tab 10/14/19


 


Clindamycin [Cleocin] 450 mg PO Q6H 10 Days #120 cap 06/12/20


 


Albuterol Inhaler [Ventolin Hfa 1 puff INHALATION TID #8 gm 06/02/23





Inhaler]  


 


Lidocaine 5% Patch [Lidoderm 5% 1 patch TOPICAL DAILY PRN 14 Days 06/02/23





Patch] #14 patch 











                                    Allergies











Allergy/AdvReac Type Severity Reaction Status Date / Time


 


lamotrigine [From Lamictal] Allergy  Rash/Hives Verified 06/02/23 07:30














Review of Systems


ROS Statement: 


Those systems with pertinent positive or pertinent negative responses have been 

documented in the HPI.


Review of Systems:


CONST: Denies fever 


EYES: Denies blurry vision 


ENT: Denies nasal congestion  


C/V: Endorses chest pain


RESP: Endorses cough, shortness of breath and coughing


GI: Denies abdominal pain 


: Denies dysuria  


SKIN: Denies rash.


MSK: Denies joint pain.


NEURO: Denies headache 


ROS Other: All systems not noted in ROS Statement are negative.





Past Medical History


Past Medical History: No Reported History


Additional Past Medical History / Comment(s): Hepatitic C,


History of Any Multi-Drug Resistant Organisms: None Reported


Past Surgical History: No Surgical Hx Reported


Past Psychological History: Anxiety, Bipolar, Depression


Smoking Status: Current every day smoker


Past Alcohol Use History: None Reported


Past Drug Use History: Cocaine, Heroin, Marijuana





General Exam





- General Exam Comments


Initial Comments: 





General: Appears in no acute distress.


HEAD:  Normal with no signs of head trauma.


EYES:  PERRLA, EOMI, conjunctiva normal, no discharge.


ENT:  Hearing grossly intact, normal oropharynx.


RESPIRATORY:  Clear breath sounds bilaterally.  No wheezes, rales, or rhonchi.  

No hypoxia.  No increased work of breathing.


C/V:  Regular rate and rhythm. S1 and S2 auscultated, no edema, peripheral 

pulses 2+ and intact throughout


ABD:  Abd is soft, nontender, nondistended


EXT: Normal range of motion, no obvious deformity.  Patient has right-sided 

anterior chest wall pain.  Worse on palpation and with movement of the right 

upper extremities as well as torso.


SKIN:  No rashes or lesions observed on exposed skin.


NEURO: Alert and oriented 4.


Limitations: no limitations





Course


                                   Vital Signs











  06/02/23 06/02/23 06/02/23





  07:27 08:40 08:47


 


Temperature 98.1 F  


 


Pulse Rate 73 70 72


 


Respiratory 22 18 17





Rate   


 


Blood Pressure 117/79  


 


O2 Sat by Pulse 98  





Oximetry   














Medical Decision Making





- Medical Decision Making





Was pt. sent in by a medical professional or institution (, PA, NP, urgent 

care, hospital, or nursing home...) When possible be specific


@  -No


Did you speak to anyone other than the patient for history (EMS, parent, family,

police, friend...)? What history was obtained from this source 


@  -No


Did you review nursing and triage notes (agree or disagree)?  Why? 


@  -I reviewed and agree with nursing and triage notes


Were old charts reviewed (outside hosp., previous admission, EMS record, old 

EKG, old radiological studies, urgent care reports/EKG's, nursing home records)?

Report findings 


@  -No old charts were reviewed


Differential Diagnosis (chest pain, altered mental status, abdominal pain women,

abdominal pain men, vaginal bleeding, weakness, fever, dyspnea, syncope, 

headache, dizziness, GI bleed, back pain, seizure, CVA, palpatations, mental 

health, musculoskeletal)? 


@  -Differential Chest Pain:


Stable Angina, Unstable Angina, STEMI, NSTEMI Aortic Dissection, Pneumothorax, 

Musculoskeletal, Esophageal Spasm GERD, Cholecystitis, Pancreatitis, Zoster, 

this is not meant to be an all-inclusive list. 





EKG interpreted by me (3pts min.).


@  -As above


X-rays interpreted by me (1pt min.).


@  -Chest x-ray revealed no obvious acute cardio pulmonary process.


CT interpreted by me (1pt min.).


@  -None done


U/S interpreted by me (1pt. min.).


@  -None done


What testing was considered but not performed or refused? (CT, X-rays, U/S, 

labs)? Why?


@  -None


What meds were considered but not given or refused? Why?


@  -None


Did you discuss the management of the patient with other professionals 

(professionals i.e. , PA, NP, lab, RT, psych nurse, , , 

teacher, , )? Give summary


@  -No


Was smoking cessation discussed for >3mins.?


@  -No


Was critical care preformed (if so, how long)?


@  -No


Were there social determinants of health that impacted care today? How? (Home

lessness, low income, unemployed, alcoholism, drug addiction, transportation, 

low edu. Level, literacy, decrease access to med. care, retirement, rehab)?


@  -No


Was there de-escalation of care discussed even if they declined (Discuss DNR or 

withdrawal of care, Hospice)? DNR status


@  -No


What co-morbidities impacted this encounter? (DM, HTN, Smoking, COPD, CAD, 

Cancer, CVA, ARF, Chemo, Hep., AIDS, mental health diagnosis, sleep apnea, 

morbid obesity)?


@  -History of smoking, polysubstance abuse


Was patient admitted / discharged? Hospital course, mention meds given and 

route, prescriptions, significant lab abnormalities, going to OR and other 

pertinent info.


@  -Based on the patient's presentation and physical exam, presents with appears

to be chest wall pain as well as a cough.  Likely is a muscle strain however we 

cannot rule out cardiopulmonary etiology at this time.  We will obtain 

cardiopulmonary labs.  He will be symptomatically treated with oral aspirin, IV 

Toradol, lidocaine patch, IV fluids as well as a breathing treatment.  Patient 

was in agreement this plan.  Vital signs are within acceptable limits.





EKG showed no signs of acute ischemia. Chest x-ray revealed no obvious acute 

cardiopulmonary process.Patient's laboratory studies returned remarkable for an 

undetectable troponin, normal d-dimer, negative viral swabs.  Remainder of the 

labs are within acceptable limits.





On reevaluation, patient is feeling improved.  We discussed his workup.  

Discussed that he has chest wall pain likely from coughing from bronchospasm 

from his marijuana use.  He states the breathing treatment did help with his 

coughing and therefore he will be prescribed an albuterol inhaler.  Is not 

actively having an asthma or COPD exacerbation.  Recommended he see's using 

marijuana possible and smoking possible.  He was in agreement this plan.  Can 

use over-the-counter analgesic medications for his chest wall pain which is 

likely secondary to muscle strain.  Recommended albuterol use as needed as well.

 He will be given a prescription for albuterol and lidocaine patches.





I will provide the patient with a prescription for albuterol inhaler, lidocaine 

patch. I instructed the patient to follow up with their PCP in the next 1-3 

days. I provided contact information for follow up with [facility]Ps.  I

explained that the patient should return to the emergency department if they 

experience any worsening symptoms. Strict return precautions were discussed with

the patient. The patient expressed understanding of these instructions. I 

answered all questions that the patient had. The patient was discharged home in 

good condition with their prescriptions and follow up information.


Undiagnosed new problem with uncertain prognosis?


@  -No


Drug Therapy requiring intensive monitoring for toxicity (Heparin, Nitro, 

Insulin, Cardizem)?


@  -No


Were any procedures done?


@  -No


Diagnosis/symptom?


@  -Chest wall pain, bronchospasm from smoking


Acute, or Chronic, or Acute on Chronic?


@  -Acute


Uncomplicated (without systemic symptoms) or Complicated (systemic symptoms)?


@  -Uncomplicated


Side effects of treatment?


@  -No


Exacerbation, Progression, or Severe Exacerbation?


@  -No


Poses a threat to life or bodily function? How? (Chest pain, USA, MI, pneumonia,

PE, COPD, DKA, ARF, appy, cholecystitis, CVA, Diverticulitis, Homicidal, 

Suicidal, threat to staff... and all critical care pts)


@  -No





- Lab Data


Result diagrams: 


                                 06/02/23 07:45





                                 06/02/23 07:45


                                   Lab Results











  06/02/23 06/02/23 06/02/23 Range/Units





  07:45 07:45 07:45 


 


WBC  10.3    (3.8-10.6)  k/uL


 


RBC  5.73    (4.30-5.90)  m/uL


 


Hgb  16.0    (13.0-17.5)  gm/dL


 


Hct  48.0    (39.0-53.0)  %


 


MCV  83.8    (80.0-100.0)  fL


 


MCH  27.9    (25.0-35.0)  pg


 


MCHC  33.3    (31.0-37.0)  g/dL


 


RDW  13.1    (11.5-15.5)  %


 


Plt Count  255    (150-450)  k/uL


 


MPV  8.1    


 


Neutrophils %  76    %


 


Lymphocytes %  15    %


 


Monocytes %  6    %


 


Eosinophils %  1    %


 


Basophils %  1    %


 


Neutrophils #  7.8 H    (1.3-7.7)  k/uL


 


Lymphocytes #  1.5    (1.0-4.8)  k/uL


 


Monocytes #  0.7    (0-1.0)  k/uL


 


Eosinophils #  0.1    (0-0.7)  k/uL


 


Basophils #  0.1    (0-0.2)  k/uL


 


PT   10.4   (9.0-12.0)  sec


 


INR   1.0   (<1.2)  


 


APTT   26.2   (22.0-30.0)  sec


 


D-Dimer   0.45   (<0.60)  mg/L FEU


 


Sodium    138  (137-145)  mmol/L


 


Potassium    4.4  (3.5-5.1)  mmol/L


 


Chloride    105  ()  mmol/L


 


Carbon Dioxide    24  (22-30)  mmol/L


 


Anion Gap    9  mmol/L


 


BUN    8 L  (9-20)  mg/dL


 


Creatinine    0.88  (0.66-1.25)  mg/dL


 


Est GFR (CKD-EPI)AfAm    >90  (>60 ml/min/1.73 sqM)  


 


Est GFR (CKD-EPI)NonAf    >90  (>60 ml/min/1.73 sqM)  


 


Glucose    88  (74-99)  mg/dL


 


Calcium    9.3  (8.4-10.2)  mg/dL


 


Magnesium    2.0  (1.6-2.3)  mg/dL


 


Total Bilirubin    0.7  (0.2-1.3)  mg/dL


 


AST    41  (17-59)  U/L


 


ALT    30  (4-49)  U/L


 


Alkaline Phosphatase    109  ()  U/L


 


Troponin I     (0.000-0.034)  ng/mL


 


NT-Pro-B Natriuret Pep     pg/mL


 


Total Protein    7.8  (6.3-8.2)  g/dL


 


Albumin    4.6  (3.5-5.0)  g/dL


 


Influenza Type A (PCR)     (Not Detectd)  


 


Influenza Type B (PCR)     (Not Detectd)  


 


RSV (PCR)     (Not Detectd)  


 


SARS-CoV-2 (PCR)     (Not Detectd)  














  06/02/23 06/02/23 06/02/23 Range/Units





  07:45 07:45 07:49 


 


WBC     (3.8-10.6)  k/uL


 


RBC     (4.30-5.90)  m/uL


 


Hgb     (13.0-17.5)  gm/dL


 


Hct     (39.0-53.0)  %


 


MCV     (80.0-100.0)  fL


 


MCH     (25.0-35.0)  pg


 


MCHC     (31.0-37.0)  g/dL


 


RDW     (11.5-15.5)  %


 


Plt Count     (150-450)  k/uL


 


MPV     


 


Neutrophils %     %


 


Lymphocytes %     %


 


Monocytes %     %


 


Eosinophils %     %


 


Basophils %     %


 


Neutrophils #     (1.3-7.7)  k/uL


 


Lymphocytes #     (1.0-4.8)  k/uL


 


Monocytes #     (0-1.0)  k/uL


 


Eosinophils #     (0-0.7)  k/uL


 


Basophils #     (0-0.2)  k/uL


 


PT     (9.0-12.0)  sec


 


INR     (<1.2)  


 


APTT     (22.0-30.0)  sec


 


D-Dimer     (<0.60)  mg/L FEU


 


Sodium     (137-145)  mmol/L


 


Potassium     (3.5-5.1)  mmol/L


 


Chloride     ()  mmol/L


 


Carbon Dioxide     (22-30)  mmol/L


 


Anion Gap     mmol/L


 


BUN     (9-20)  mg/dL


 


Creatinine     (0.66-1.25)  mg/dL


 


Est GFR (CKD-EPI)AfAm     (>60 ml/min/1.73 sqM)  


 


Est GFR (CKD-EPI)NonAf     (>60 ml/min/1.73 sqM)  


 


Glucose     (74-99)  mg/dL


 


Calcium     (8.4-10.2)  mg/dL


 


Magnesium     (1.6-2.3)  mg/dL


 


Total Bilirubin     (0.2-1.3)  mg/dL


 


AST     (17-59)  U/L


 


ALT     (4-49)  U/L


 


Alkaline Phosphatase     ()  U/L


 


Troponin I  <0.012    (0.000-0.034)  ng/mL


 


NT-Pro-B Natriuret Pep   24   pg/mL


 


Total Protein     (6.3-8.2)  g/dL


 


Albumin     (3.5-5.0)  g/dL


 


Influenza Type A (PCR)    Not Detected  (Not Detectd)  


 


Influenza Type B (PCR)    Not Detected  (Not Detectd)  


 


RSV (PCR)    Not Detected  (Not Detectd)  


 


SARS-CoV-2 (PCR)    Not Detected  (Not Detectd)  














- EKG Data


-: EKG Interpreted by Me


EKG Comments: 





12-lead Electrocardiogram Interpretation Note





EKG was reviewed and interpreted by myself. 12-lead ECG performed at 0736 is 

interpreted by me as revealing sinus bradycardia at a rate of 57 beats per mi

nute.  Axis is normal.  MT interval is 116 ms, QRS duration 70 ms, QTc is 416 

ms..  There were no ST or T wave abnormalities to suggest myocardial ischemia or

injury. R wave progression across the precordium was satisfactory. By my 

interpretation this EKG is non-diagnostic for acute ischemia.





Disposition


Clinical Impression: 


 Chest wall pain, Bronchospasm





Disposition: HOME SELF-CARE


Condition: Good


Instructions (If sedation given, give patient instructions):  Chest Wall Pain 

(ED)


Prescriptions: 


Lidocaine 5% Patch [Lidoderm 5% Patch] 1 patch TOPICAL DAILY PRN 14 Days #14 

patch


 PRN Reason: Pain


Albuterol Inhaler [Ventolin Hfa Inhaler] 1 puff INHALATION TID #8 gm


Is patient prescribed a controlled substance at d/c from ED?: No


Referrals: 


None,Stated [Primary Care Provider] - 1-2 days


Forms:   [facility]


Time of Disposition: 09:08

## 2023-06-02 NOTE — XR
EXAMINATION TYPE: XR chest 2V

 

DATE OF EXAM: 6/2/2023

 

COMPARISON: 10/14/2019

 

HISTORY: Chest pain

 

TECHNIQUE:  Frontal and lateral views of the chest are obtained.

 

FINDINGS:  

 

There is no focal air space opacity.

 

No evidence for pneumothorax.  No pleural effusion.

 

The cardiac silhouette size is within normal limits.

 

The osseous structures are grossly intact.

 

IMPRESSION:  

 

1.  No acute cardiopulmonary process.

## 2024-08-27 ENCOUNTER — HOSPITAL ENCOUNTER (EMERGENCY)
Dept: HOSPITAL 47 - EC | Age: 39
Discharge: HOME | End: 2024-08-27
Payer: COMMERCIAL

## 2024-08-27 VITALS — HEART RATE: 91 BPM | DIASTOLIC BLOOD PRESSURE: 87 MMHG | SYSTOLIC BLOOD PRESSURE: 145 MMHG | RESPIRATION RATE: 18 BRPM

## 2024-08-27 VITALS — TEMPERATURE: 98 F

## 2024-08-27 PROCEDURE — 99282 EMERGENCY DEPT VISIT SF MDM: CPT

## 2024-08-27 NOTE — ED
Skin/Abscess/FB HPI





- General


Chief complaint: Skin/Abscess/Foreign Body


Stated complaint: Rash


Time Seen by Provider: 08/27/24 13:34


Source: patient, RN notes reviewed


Mode of arrival: ambulatory


Limitations: no limitations





- History of Present Illness


Initial comments: 


39-year-old male presents emergency department chief complaint of rash to left 

side of his neck.  Patient is noticed today he states not painful minimal 

itchiness.  Denies any new products denies any fevers or chills no recent 

illnesses.  Patient offers no other complaints.





- Related Data


                                  Previous Rx's











 Medication  Instructions  Recorded


 


Naproxen [Naprosyn] 250 mg PO BID PRN  tab 08/09/19


 


lamoTRIgine [LaMICtal] 100 mg PO DAILY #28 tab 08/09/19


 


Erythromycin Ophth Oint [Romycin 1 applic RIGHT EYE QID 5 Days #1 09/25/19





Ophth Oint] tube 


 


Ibuprofen [Motrin] 600 mg PO Q8HR PRN #30 tab 10/14/19


 


Clindamycin [Cleocin] 450 mg PO Q6H 10 Days #120 cap 06/12/20


 


Albuterol Inhaler [Ventolin Hfa 1 puff INHALATION TID #8 gm 06/02/23





Inhaler]  


 


Lidocaine 5% Patch [Lidoderm 5% 1 patch TOPICAL DAILY PRN 14 Days 06/02/23





Patch] #14 patch 


 


Albuterol Inhaler [Ventolin Hfa 2 puff INHALATION TID #8 gm 10/02/23





Inhaler]  


 


methylPREDNISolone Dose Pack 4 mg PO AS DIRECTED #1 packet 10/02/23





[Medrol Dose Pack]  


 


Triamcinolone 0.1% Cream [Kenalog 1 applicatio TOPICAL BID #30 gram 08/27/24





0.1% Cream]  











                                    Allergies











Allergy/AdvReac Type Severity Reaction Status Date / Time


 


lamotrigine [From Lamictal] Allergy  Rash/Hives Verified 08/27/24 13:32














Review of Systems


ROS Statement: 


Those systems with pertinent positive or pertinent negative responses have been 

documented in the HPI.





ROS Other: All systems not noted in ROS Statement are negative.





Past Medical History


Past Medical History: No Reported History


Additional Past Medical History / Comment(s): Hepatitic C,


History of Any Multi-Drug Resistant Organisms: None Reported


Past Surgical History: No Surgical Hx Reported


Past Psychological History: Anxiety, Bipolar, Depression


Smoking Status: Current every day smoker


Past Alcohol Use History: None Reported


Past Drug Use History: Cocaine, Heroin, IV Drug Use, Marijuana, Methamphetamine,

Opiates, Prescription Drug Abuse





General Exam


Limitations: no limitations


General appearance: alert, in no apparent distress


Head exam: Present: atraumatic, normocephalic, normal inspection


Eye exam: Present: normal appearance, PERRL, EOMI.  Absent: scleral icterus, 

conjunctival injection, periorbital swelling


Respiratory exam: Present: normal lung sounds bilaterally.  Absent: respiratory 

distress, wheezes, rales, rhonchi, stridor


Cardiovascular Exam: Present: regular rate, normal rhythm, normal heart sounds. 

Absent: systolic murmur, diastolic murmur, rubs, gallop, clicks


Skin exam: Present: warm, dry, intact, normal color, rash (Erythematous papular 

rash to left side of neck with mild excoriations)





Course


                                   Vital Signs











  08/27/24





  13:30


 


Temperature 98 F


 


Pulse Rate 99


 


Respiratory 16





Rate 


 


Blood Pressure 152/91


 


O2 Sat by Pulse 96





Oximetry 














Medical Decision Making





- Medical Decision Making


Was pt. sent in by a medical professional or institution ( PA, NP, urgent 

care, hospital, or nursing home...) When possible be specific


@ -No


Did you speak to anyone other than the patient for history (EMS, parent, family,

police, friend...)? What history was obtained from this source 


@ -No


Did you review nursing and triage notes (agree or disagree)? Why? 


@ -I reviewed and agree with nursing and triage notes


Were old charts reviewed (outside hosp., previous admission, EMS record, old 

EKG, old radiological studies, urgent care reports/EKG's, nursing home records)?

Report findings 


@ -No old charts were reviewed


Differential Diagnosis (chest pain, altered mental status, abdominal pain women,

abdominal pain men, vaginal bleeding, weakness, fever, dyspnea, syncope, 

headache, dizziness, GI bleed, back pain, seizure, CVA, palpatations, mental 

health, musculoskeletal)? 


@ -Contact dermatitis, allergic reaction, scabies, shingles


EKG interpreted by me (3pts min.).


@ -[None


X-rays interpreted by me (1pt min.).


@ -None done


CT interpreted by me (1pt min.).


@ -None done


U/S interpreted by me (1pt. min.).


@ -None done


What testing was considered but not performed or refused? (CT, X-rays, U/S, 

labs)? Why?


@ -None


What meds were considered but not given or refused? Why?


@ -None


Did you discuss the management of the patient with other professionals 

(professionals i.e. , PA, NP, lab, RT, psych nurse, , , 

teacher, , )? Give summary


@ -No


Was smoking cessation discussed for >3mins.?


@ -No


Was critical care preformed (if so, how long)?


@ -No


Were there social determinants of health that impacted care today? How? 

(Homelessness, low income, unemployed, alcoholism, drug addiction, 

transportation, low edu. Level, literacy, decrease access to med. care, senior care, 

rehab)?


@ -No


Was there de-escalation of care discussed even if they declined (Discuss DNR or 

withdrawal of care, Hospice)? DNR status


@ -No


What co-morbidities impacted this encounter? (DM, HTN, Smoking, COPD, CAD, 

Cancer, CVA, ARF, Chemo, Hep., AIDS, mental health diagnosis, sleep apnea, 

morbid obesity)?


@ -None


Was patient admitted / discharged? Hospital course, mention meds given and r

oute, prescriptions, significant lab abnormalities, going to OR and other 

pertinent info.


@ -Discharge patient appears to have contact dermatitis on the left side of his 

neck was given dose steroids, Kenalog cream.


Undiagnosed new problem with uncertain prognosis?


@ -No


Drug Therapy requiring intensive monitoring for toxicity (Heparin, Nitro, 

Insulin, Cardizem)?


@ -No


Were any procedures done?


@ -No


Diagnosis/symptom?


@ -Contact dermatitis


Acute, or Chronic, or Acute on Chronic?


@ -acute


Uncomplicated (without systemic symptoms) or Complicated (systemic symptoms)?


@ -complicated


Side effects of treatment?


@ -No


Exacerbation, Progression, or Severe Exacerbation?


@ -No


Poses a threat to life or bodily function? How? (Chest pain, USA, MI, pneumonia,

 PE, COPD, DKA, ARF, appy, cholecystitis, CVA, Diverticulitis, Homicidal, 

Suicidal, threat to staff... and all critical care pts)


@ -No








Disposition


Clinical Impression: 


 Contact dermatitis





Disposition: HOME SELF-CARE


Condition: Stable


Instructions (If sedation given, give patient instructions):  Contact Dermatitis

 (ED)


Additional Instructions: 


Please return to the Emergency Department if symptoms worsen or any other 

concerns.


Prescriptions: 


Triamcinolone 0.1% Cream [Kenalog 0.1% Cream] 1 applicatio TOPICAL BID #30 gram


Is patient prescribed a controlled substance at d/c from ED?: No


Referrals: 


None,Stated [Primary Care Provider] - 1-2 days


Time of Disposition: 14:08

## 2025-03-19 ENCOUNTER — HOSPITAL ENCOUNTER (EMERGENCY)
Dept: HOSPITAL 47 - EC | Age: 40
LOS: 1 days | Discharge: HOME | End: 2025-03-20
Payer: SELF-PAY

## 2025-03-19 VITALS — TEMPERATURE: 98.2 F

## 2025-03-19 DIAGNOSIS — F17.200: ICD-10-CM

## 2025-03-19 DIAGNOSIS — Z88.8: ICD-10-CM

## 2025-03-19 DIAGNOSIS — J10.1: Primary | ICD-10-CM

## 2025-03-19 DIAGNOSIS — Z59.01: ICD-10-CM

## 2025-03-19 PROCEDURE — 71046 X-RAY EXAM CHEST 2 VIEWS: CPT

## 2025-03-19 PROCEDURE — 87636 SARSCOV2 & INF A&B AMP PRB: CPT

## 2025-03-19 PROCEDURE — 99284 EMERGENCY DEPT VISIT MOD MDM: CPT

## 2025-03-19 RX ADMIN — IBUPROFEN STA MG: 600 TABLET ORAL at 23:42

## 2025-03-19 RX ADMIN — ONDANSETRON STA MG: 4 TABLET, ORALLY DISINTEGRATING ORAL at 23:43

## 2025-03-19 SDOH — ECONOMIC STABILITY - HOUSING INSECURITY: SHELTERED HOMELESSNESS: Z59.01

## 2025-03-20 VITALS — HEART RATE: 82 BPM | RESPIRATION RATE: 19 BRPM | SYSTOLIC BLOOD PRESSURE: 122 MMHG | DIASTOLIC BLOOD PRESSURE: 79 MMHG

## 2025-03-20 NOTE — ED
Headache HPI





- General


Chief Complaint: Headache


Stated Complaint: NVD head pressure


Time Seen by Provider: 03/19/25 22:38


Mode of arrival: ambulatory


Limitations: no limitations





- History of Present Illness


Initial Comments: 


39-year-old male presenting with chief complaint of URI-like symptoms.  Patient 

is also complaining of headache diarrhea nausea vomiting and bodyaches.  Symp

toms ongoing for the last 2 days.  He reports he is currently living in a 

homeless shelter which makes dealing with his symptoms more difficult.  No chest

pain or difficulty breathing.  No abdominal pain.  No blood in his stool or 

emesis.  No vision or hearing changes.  No numbness tingling or weakness.








- Related Data


                                  Previous Rx's











 Medication  Instructions  Recorded


 


Naproxen [Naprosyn] 250 mg PO BID PRN  tab 08/09/19


 


lamoTRIgine [LaMICtal] 100 mg PO DAILY #28 tab 08/09/19


 


Erythromycin Ophth Oint [Romycin 1 applic RIGHT EYE QID 5 Days #1 09/25/19





Ophth Oint] tube 


 


Ibuprofen [Motrin] 600 mg PO Q8HR PRN #30 tab 10/14/19


 


Clindamycin [Cleocin] 450 mg PO Q6H 10 Days #120 cap 06/12/20


 


Albuterol Inhaler [Ventolin Hfa 1 puff INHALATION TID #8 gm 06/02/23





Inhaler]  


 


Lidocaine 5% Patch [Lidoderm 5% 1 patch TOPICAL DAILY PRN 14 Days 06/02/23





Patch] #14 patch 


 


Albuterol Inhaler [Ventolin Hfa 2 puff INHALATION TID #8 gm 10/02/23





Inhaler]  


 


methylPREDNISolone Dose Pack 4 mg PO AS DIRECTED #1 packet 10/02/23





[Medrol Dose Pack]  


 


Triamcinolone 0.1% Cream [Kenalog 1 applicatio TOPICAL BID #30 gram 08/27/24





0.1% Cream]  


 


Ondansetron Odt [Zofran Odt] 4 mg PO Q8HR PRN #20 tab 03/20/25


 


Oseltamivir [Tamiflu] 75 mg PO Q12HR 5 Days #10 cap 03/20/25











                                    Allergies











Allergy/AdvReac Type Severity Reaction Status Date / Time


 


lamotrigine [From Lamictal] Allergy  Rash/Hives Verified 03/19/25 21:54














Review of Systems


ROS Statement: 


Those systems with pertinent positive or pertinent negative responses have been 

documented in the HPI.





ROS Other: All systems not noted in ROS Statement are negative.





Past Medical History


Past Medical History: No Reported History


Additional Past Medical History / Comment(s): Hepatitic C,


History of Any Multi-Drug Resistant Organisms: None Reported


Past Surgical History: No Surgical Hx Reported


Past Psychological History: Anxiety, Bipolar, Depression


Smoking Status: Current every day smoker


Past Alcohol Use History: None Reported


Past Drug Use History: Cocaine, Heroin, IV Drug Use, Marijuana, Methamphetamine,

Opiates, Prescription Drug Abuse





General Exam


Limitations: no limitations


General appearance: alert, in no apparent distress


Head exam: Present: atraumatic, normocephalic, normal inspection


Eye exam: Present: normal appearance, EOMI.  Absent: periorbital swelling


ENT exam: Present: mucous membranes moist


Neck exam: Present: normal inspection.  Absent: meningismus


Respiratory exam: Present: normal lung sounds bilaterally.  Absent: respiratory 

distress, wheezes, rales, rhonchi, stridor


Cardiovascular Exam: Present: regular rate, normal rhythm, normal heart sounds. 

Absent: systolic murmur, diastolic murmur, rubs, gallop, clicks


GI/Abdominal exam: Present: soft.  Absent: distended, tenderness, guarding, 

rebound, rigid


Neurological exam: Present: alert, oriented X3


Psychiatric exam: Present: normal affect, normal mood


Skin exam: Present: warm, dry, normal color





Course


                                   Vital Signs











  03/19/25 03/20/25





  21:50 01:22


 


Temperature 98.2 F 98.2 F


 


Pulse Rate 90 82


 


Respiratory 18 19





Rate  


 


Blood Pressure 121/91 122/79


 


O2 Sat by Pulse 98 98





Oximetry  














Medical Decision Making





- Medical Decision Making


Was pt. sent in by a medical professional or institution (, PA, NP, urgent 

care, hospital, or nursing home...) When possible be specific


@  -No


Did you speak to anyone other than the patient for history (EMS, parent, family,

police, friend...)? What history was obtained from this source 


@  -No


Did you review nursing and triage notes (agree or disagree)?  Why? 


@  -I reviewed and agree with nursing and triage notes


Were old charts reviewed (outside hosp., previous admission, EMS record, old 

EKG, old radiological studies, urgent care reports/EKG's, nursing home records)?

Report findings 


@  -No old charts were reviewed


Differential Diagnosis (chest pain, altered mental status, abdominal pain women,

abdominal pain men, vaginal bleeding, weakness, fever, dyspnea, syncope, 

headache, dizziness, GI bleed, back pain, seizure, CVA, palpatations, mental 

health, musculoskeletal)? 


@  -Differential includes influenza, RSV, COVID, pneumonia, bronchitis, not an 

all-inclusive list


EKG interpreted by me (3pts min.).


@  -As above


X-rays interpreted by me (1pt min.).


@  -Chest x-ray shows no acute process


CT interpreted by me (1pt min.).


@  -None done


U/S interpreted by me (1pt. min.).


@  -None done


What testing was considered but not performed or refused? (CT, X-rays, U/S, 

labs)? Why?


@  -None


What meds were considered but not given or refused? Why?


@  -None


Did you discuss the management of the patient with other professionals 

(professionals i.e. , PA, NP, lab, RT, psych nurse, , , 

teacher, , )? Give summary


@  -No


Was smoking cessation discussed for >3mins.?


@  -No


Was critical care preformed (if so, how long)?


@  -No


Were there social determinants of health that impacted care today? How? 

(Homelessness, low income, unemployed, alcoholism, drug addiction, transporta

tion, low edu. Level, literacy, decrease access to med. care, [facility], rehab)?


@  -No


Was there de-escalation of care discussed even if they declined (Discuss DNR or 

withdrawal of care, Hospice)? DNR status


@  -No


What co-morbidities impacted this encounter? (DM, HTN, Smoking, COPD, CAD, 

Cancer, CVA, ARF, Chemo, Hep., AIDS, mental health diagnosis, sleep apnea, 

morbid obesity)?


@  -None


Was patient admitted / discharged? Hospital course, mention meds given and 

route, prescriptions, significant lab abnormalities, going to OR and other 

pertinent info.


@  -39-year-old male presenting with chief complaint of URI-like symptoms nausea

vomiting and diarrhea.  History and physical examination are conducted.  Patient

given Motrin and Zofran.  He is positive for influenza A.  Chest x-ray shows no 

acute process.  Patient is educated on today's findings.  Patient would like to 

start Tamiflu, Tamiflu and Zofran sent to his pharmacy.  Follow-up with PCP.  

Report back to ER with any new or worsening symptoms.  Discussed return 

parameters and answered all questions.  Patient conveyed verbal understanding 

and agreed to the plan.  I discussed this case in detail with my attending Dr. Meadows


Undiagnosed new problem with uncertain prognosis?


@  -No


Drug Therapy requiring intensive monitoring for toxicity (Heparin, Nitro, 

Insulin, Cardizem)?


@  -No


Were any procedures done?


@  -No


Diagnosis/symptom?


@  -Influenza


Acute, or Chronic, or Acute on Chronic?


@  -Acute


Uncomplicated (without systemic symptoms) or Complicated (systemic symptoms)?


@  -Uncomplicated


Side effects of treatment?


@  -No


Exacerbation, Progression, or Severe Exacerbation?


@  -No


Poses a threat to life or bodily function? How? (Chest pain, USA, MI, pneumonia,

PE, COPD, DKA, ARF, appy, cholecystitis, CVA, Diverticulitis, Homicidal, 

Suicidal, threat to staff... and all critical care pts)


@  -Low likelihood











- Lab Data


                                   Lab Results











  03/19/25 Range/Units





  23:48 


 


Influenza Type A (PCR)  Detected A  (Not Detectd)  


 


Influenza Type B (PCR)  Not Detected  (Not Detectd)  


 


RSV (PCR)  Not Detected  (Not Detectd)  


 


SARS-CoV-2 (PCR)  Not Detected  (Not Detectd)  














Disposition


Clinical Impression: 


 Influenza A





Disposition: HOME SELF-CARE


Condition: Good


Instructions (If sedation given, give patient instructions):  Influenza (ED)


Additional Instructions: 


Follow-up with PCP.  Report back to ER with any new or worsening symptoms.


Prescriptions: 


Oseltamivir [Tamiflu] 75 mg PO Q12HR 5 Days #10 cap


Ondansetron Odt [Zofran Odt] 4 mg PO Q8HR PRN #20 tab


 PRN Reason: Nausea


Is patient prescribed a controlled substance at d/c from ED?: No


Referrals: 


None,Stated [Primary Care Provider] - 1-2 days


Forms:   [facility]


Time of Disposition: 01:10

## 2025-03-20 NOTE — XR
EXAM:

  XR Chest, 2 Views

 

CLINICAL HISTORY:

  ITS.REASON XR Reason: cough

 

TECHNIQUE:

  Frontal and lateral views of the chest.

 

COMPARISON:

  Chest radiograph on 10/2/2023

 

FINDINGS:

  Hardware: None.

 

  Lungs/pleura: Normal. No focal consolidation. No pleural effusion or 

pneumothorax.

 

  Heart/mediastinum: Normal. No cardiomegaly.

 

  Soft tissues: Unremarkable.

 

  Bones: No acute fracture.

 

  Upper abdomen: Normal.

 

IMPRESSION:   

  No acute disease identified.

## 2025-07-09 ENCOUNTER — HOSPITAL ENCOUNTER (EMERGENCY)
Dept: HOSPITAL 47 - EC | Age: 40
Discharge: HOME | End: 2025-07-09
Payer: COMMERCIAL

## 2025-07-09 VITALS
RESPIRATION RATE: 18 BRPM | HEART RATE: 86 BPM | DIASTOLIC BLOOD PRESSURE: 76 MMHG | SYSTOLIC BLOOD PRESSURE: 120 MMHG | TEMPERATURE: 97.9 F

## 2025-07-09 DIAGNOSIS — F17.200: ICD-10-CM
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
## 2025-07-09 DIAGNOSIS — Z88.8: ICD-10-CM

## 2025-07-09 DIAGNOSIS — S09.90XA: Primary | ICD-10-CM
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
## 2025-07-09 PROCEDURE — 99283 EMERGENCY DEPT VISIT LOW MDM: CPT

## 2025-07-09 RX ADMIN — IBUPROFEN STA MG: 600 TABLET ORAL at 16:53
